# Patient Record
Sex: MALE | Race: WHITE | NOT HISPANIC OR LATINO | Employment: OTHER | URBAN - NONMETROPOLITAN AREA
[De-identification: names, ages, dates, MRNs, and addresses within clinical notes are randomized per-mention and may not be internally consistent; named-entity substitution may affect disease eponyms.]

---

## 2022-10-13 ENCOUNTER — OFFICE VISIT (OUTPATIENT)
Dept: UROLOGY | Facility: CLINIC | Age: 77
End: 2022-10-13

## 2022-10-13 ENCOUNTER — LAB (OUTPATIENT)
Dept: LAB | Facility: HOSPITAL | Age: 77
End: 2022-10-13

## 2022-10-13 VITALS
DIASTOLIC BLOOD PRESSURE: 80 MMHG | BODY MASS INDEX: 25.03 KG/M2 | TEMPERATURE: 97.1 F | WEIGHT: 169 LBS | HEART RATE: 64 BPM | OXYGEN SATURATION: 97 % | SYSTOLIC BLOOD PRESSURE: 132 MMHG | HEIGHT: 69 IN | RESPIRATION RATE: 18 BRPM

## 2022-10-13 DIAGNOSIS — N40.1 BENIGN PROSTATIC HYPERPLASIA (BPH) WITH URINARY URGENCY: Primary | ICD-10-CM

## 2022-10-13 DIAGNOSIS — R35.1 NOCTURIA: ICD-10-CM

## 2022-10-13 DIAGNOSIS — N52.9 ERECTILE DYSFUNCTION, UNSPECIFIED ERECTILE DYSFUNCTION TYPE: ICD-10-CM

## 2022-10-13 DIAGNOSIS — R39.15 BENIGN PROSTATIC HYPERPLASIA (BPH) WITH URINARY URGENCY: Primary | ICD-10-CM

## 2022-10-13 LAB
BILIRUB BLD-MCNC: NEGATIVE MG/DL
CLARITY, POC: CLEAR
COLOR UR: YELLOW
EXPIRATION DATE: NORMAL
GLUCOSE UR STRIP-MCNC: NEGATIVE MG/DL
KETONES UR QL: NEGATIVE
LEUKOCYTE EST, POC: NEGATIVE
Lab: NORMAL
NITRITE UR-MCNC: NEGATIVE MG/ML
PH UR: 6 [PH] (ref 5–8)
PROT UR STRIP-MCNC: NEGATIVE MG/DL
PSA SERPL-MCNC: 1.95 NG/ML (ref 0–4)
RBC # UR STRIP: NEGATIVE /UL
SP GR UR: 1.01 (ref 1–1.03)
UROBILINOGEN UR QL: NORMAL

## 2022-10-13 PROCEDURE — 84153 ASSAY OF PSA TOTAL: CPT

## 2022-10-13 PROCEDURE — 36415 COLL VENOUS BLD VENIPUNCTURE: CPT

## 2022-10-13 PROCEDURE — 51798 US URINE CAPACITY MEASURE: CPT | Performed by: UROLOGY

## 2022-10-13 PROCEDURE — 99204 OFFICE O/P NEW MOD 45 MIN: CPT | Performed by: UROLOGY

## 2022-10-13 RX ORDER — TADALAFIL 5 MG/1
5 TABLET ORAL DAILY
Qty: 30 TABLET | Refills: 11 | Status: SHIPPED | OUTPATIENT
Start: 2022-10-13 | End: 2022-12-28 | Stop reason: HOSPADM

## 2022-10-13 NOTE — PROGRESS NOTES
Chief Complaint  LUTS    Referring Provider  Renetta Wade PA    HPI  Mr. Collins is a 77 y.o. male with history of diabetes mellitus who presents with lower urinary tract symptoms.  Primary symptom includes: Weak stream, urgency, nocturia x1, frequency and intermittency  Patient denies dysuria or hematuria.  Onset was many years ago.    Previous treatments include: super beta prostate, flomax, cialis    He has ED as well. Took daily Cialis and says it helped     IPSS Questionnaire (AUA-7):  Incomplete emptying  Over the past month, how often have you had a sensation of not emptying your bladder completely after you finish?: Less than 1 time in 5 (10/13/22 0852)  Frequency  Over the past month, how often have you had to urinate again less than two hours after you finishing urinating ?: About half the time (10/13/22 0852)  Intermittency  Over the past month, how often have you found you stopped and started again several time when you urinated ?: About half the time (10/13/22 0852)  Urgency  Over the last month, how difficult  have you found it to postpone urination ?: more than half the time (10/13/22 0852)  Weak Stream  Over the past month, how often have you had a weak urinary stream ?: Almost always (10/13/22 0852)  Straining  Over the past month, how often have you had to push or strain to begin urination ?: Not at all (10/13/22 0852)  Nocturia  Over the past month, how many times did you most typically get up to urinate from the time you went to bed until the time you got up in the morning ?: Less than 1 time in 5 (10/13/22 0852)  Quality of life due to urinary symptoms  If you were to spend the rest of your life with your urinary condition the way it is now, how would feel about that?: Mostly dissatified (10/13/22 0852)    Scores  Total IPSS Score: 17 (10/13/22 0852)  Total Score = Symtomatic Level: moderately symptomatic: 8-19 (10/13/22 0852)       Past Medical History  Past Medical History:   Diagnosis  "Date   • Diabetes mellitus (HCC)        Past Surgical History  No past surgical history on file.    Medications  No current outpatient medications on file.    Allergies  No Known Allergies    Social History  Social History     Socioeconomic History   • Marital status:    Tobacco Use   • Smoking status: Never   • Smokeless tobacco: Never   Vaping Use   • Vaping Use: Never used   Substance and Sexual Activity   • Alcohol use: Never   • Drug use: Never   • Sexual activity: Defer       Family History  He has no family history of prostate cancer  No family history on file.    Review of Systems  A review of systems was notable for diabetes mellitus.    Physical Exam  Visit Vitals  /80 (BP Location: Right arm, Patient Position: Sitting, Cuff Size: Adult)   Pulse 64   Temp 97.1 °F (36.2 °C) (Temporal)   Resp 18   Ht 175.3 cm (69\")   Wt 76.7 kg (169 lb)   SpO2 97%   BMI 24.96 kg/m²     Physical exam notable for normal habitus.    Genitourinary  Deferred    Labs  No results found for: PSA    Brief Urine Lab Results     None          PVR  Post-void residual performed by staff - 0 mL      Assessment  Mr. Collins is a 77 y.o. male who presents with LUTS, primarily weak stream.    Plan  1.  Follow up for cystoscopy, TRUS, Uroflow, MICHAEL, GE. Risks are advanced age  2.  PSA  3. Daily Cialis per his request        Maik Graham MD    "

## 2022-11-10 ENCOUNTER — PROCEDURE VISIT (OUTPATIENT)
Dept: UROLOGY | Facility: CLINIC | Age: 77
End: 2022-11-10

## 2022-11-10 DIAGNOSIS — R35.1 NOCTURIA: ICD-10-CM

## 2022-11-10 DIAGNOSIS — I05.9 MITRAL VALVE DISORDER: ICD-10-CM

## 2022-11-10 DIAGNOSIS — R39.15 BENIGN PROSTATIC HYPERPLASIA (BPH) WITH URINARY URGENCY: Primary | ICD-10-CM

## 2022-11-10 DIAGNOSIS — N52.9 ERECTILE DYSFUNCTION, UNSPECIFIED ERECTILE DYSFUNCTION TYPE: ICD-10-CM

## 2022-11-10 DIAGNOSIS — N40.1 BENIGN PROSTATIC HYPERPLASIA (BPH) WITH URINARY URGENCY: Primary | ICD-10-CM

## 2022-11-10 PROCEDURE — 52000 CYSTOURETHROSCOPY: CPT | Performed by: UROLOGY

## 2022-11-10 PROCEDURE — 99214 OFFICE O/P EST MOD 30 MIN: CPT | Performed by: UROLOGY

## 2022-11-10 PROCEDURE — 76942 ECHO GUIDE FOR BIOPSY: CPT | Performed by: UROLOGY

## 2022-11-10 PROCEDURE — 51741 ELECTRO-UROFLOWMETRY FIRST: CPT | Performed by: UROLOGY

## 2022-11-10 RX ORDER — SODIUM CHLORIDE 9 MG/ML
100 INJECTION, SOLUTION INTRAVENOUS CONTINUOUS
Status: CANCELLED | OUTPATIENT
Start: 2022-11-10

## 2022-11-10 RX ORDER — SODIUM CHLORIDE 0.9 % (FLUSH) 0.9 %
3 SYRINGE (ML) INJECTION EVERY 12 HOURS SCHEDULED
Status: CANCELLED | OUTPATIENT
Start: 2022-11-10

## 2022-11-10 RX ORDER — SODIUM CHLORIDE 0.9 % (FLUSH) 0.9 %
3-10 SYRINGE (ML) INJECTION AS NEEDED
Status: CANCELLED | OUTPATIENT
Start: 2022-11-10

## 2022-11-10 NOTE — PROGRESS NOTES
CC  LUTS / BPH Workup    HPI  Ms. Collins is a 77 y.o. male with history below in assessment, who presents for follow up.     At this visit patient is here for BPH workup and discussion.     Past Medical History:   Diagnosis Date   • Diabetes mellitus (HCC)        No past surgical history on file.      Current Outpatient Medications:   •  tadalafil (CIALIS) 5 MG tablet, Take 1 tablet by mouth Daily., Disp: 30 tablet, Rfl: 11     Physical Exam  There were no vitals taken for this visit.    Labs  Brief Urine Lab Results  (Last result in the past 365 days)      Color   Clarity   Blood   Leuk Est   Nitrite   Protein   CREAT   Urine HCG        10/13/22 0904 Yellow   Clear   Negative   Negative   Negative   Negative                 No results found for: GLUCOSE, CALCIUM, NA, K, CO2, CL, BUN, CREATININE, EGFRIFAFRI, EGFRIFNONA, BCR, ANIONGAP    No results found for: WBC, HGB, HCT, MCV, PLT         Lab Results   Component Value Date    PSA 1.950 10/13/2022       Radiographic Studies  No Images in the past 120 days found..    I have reviewed above labs and imaging.     • CYSTOSCOPY  Preprocedure diagnosis  LUTS  Postprocedure diagnosis  Same  Procedure  Flexible Cystourethroscopy  Attending surgeon  Maik Graham MD  Anesthesia  2% lidocaine jelly intraurethrally  Complications  None  Indications  77 y.o. male undergoing a flexible cystoscopy for the above mentioned indications.  Informed consent was obtained.    Findings  Cystoscopic findings included one right and left ureteral orifice in the normal anatomic position with normal bladder mucosa and no tumors, masses or stones. The urethral urothelium was within normal limits with no strictures.  There was a high bladder neck/ median lobe.  The lateral lobes were also obstructive in appearance.  Asymmetrically so more on the right side  Procedure  The patient was placed in supine position and prepped and draped in sterile fashion with lidocaine jelly per urethra for  anesthesia.  A timeout was performed.  The 14F flexible cystoscope was lubricated and gently placed through the penile urethra and into the bladder.  The bladder was completely visualized.  The cystoscope was retroflexed and the bladder neck and prostate visualized.  The cystoscope was slowly withdrawn while visualizing the urethra and the procedure terminated.  The patient tolerated the procedure well.      • TRUS OF PROSTATE   Preoperative diagnosis  LUTS  Postoperative diagnosis  Same  Procedure  1.  Transrectal ultrasound of the prostate  Attending Surgeon  Maik Graham MD  Anesthesia  2% lidocaine jelly, intrarectal instillation, 10mL  Complications  None  Specimen  None  Indications  Mr. Collins is a 77 y.o. male with LUTS.  He presents for prostate ultrasound to evaluate prostate size.  Procedure  The patient was positioned and prepped in a left lateral position with lower extremities flexed.  Lidocaine jelly, 2%, was injected per rectum. A digital rectal exam was performed which demonstrated a smooth prostate without nodules or induration. The [employer] E8CS rectal ultrasound probe was slowly introduced into the rectum without difficulty.  The prostate and seminal vesicles were inspected systematically using cross and sagittal views with the ultrasound. The dimensions of the prostate were measured, for a calculated volume of 43.3 mL with 4.5 cm prostatic width.  The seminal vesicles appeared normal.  The rectal ultrasound probe was removed.  The patient tolerated the procedure well.    • UROFLOW  Peak flow rate -5.4 mL/sec  Average flow rate -2.8 mL/sec  Flow curve -weak low and flat  Voided volume -345 mL    I personally reviewed  and interpreted this study.       Assessment  77 y.o. male with worsening of chronic lower urinary tract symptoms.  He had an occlusive prostate with middle lobe, as well as very weak stream on uroflow.    He is on warfarin due to a mitral valve annuloplasty in the past in the 1990s  in [place].  We discussed this and it is unclear if he needs a Lovenox bridge to come off of his warfarin for the TURP.  I will place an urgent referral to our cardiologist Dr. Capps for guidance and cardiac risk stratification.    In a separate room and encounter after his workup, we discussed his prostatic occlusion    Plan  1. Schedule for TURP at the hospital end of December.  I think he would benefit the most from this due to the median lobe/high bladder neck.   2.  Follow-up in 4 weeks after he has seen Dr. Capps to discuss his anticoagulation plan

## 2022-12-06 ENCOUNTER — OFFICE VISIT (OUTPATIENT)
Dept: CARDIOLOGY | Facility: CLINIC | Age: 77
End: 2022-12-06

## 2022-12-06 VITALS
RESPIRATION RATE: 14 BRPM | HEIGHT: 69 IN | OXYGEN SATURATION: 98 % | WEIGHT: 167 LBS | BODY MASS INDEX: 24.73 KG/M2 | HEART RATE: 63 BPM | DIASTOLIC BLOOD PRESSURE: 72 MMHG | SYSTOLIC BLOOD PRESSURE: 130 MMHG

## 2022-12-06 DIAGNOSIS — I05.9 MITRAL VALVE DISORDER: ICD-10-CM

## 2022-12-06 DIAGNOSIS — E78.00 PURE HYPERCHOLESTEROLEMIA: ICD-10-CM

## 2022-12-06 DIAGNOSIS — Z01.810 PREOP CARDIOVASCULAR EXAM: Primary | ICD-10-CM

## 2022-12-06 DIAGNOSIS — I10 BENIGN HYPERTENSION: ICD-10-CM

## 2022-12-06 DIAGNOSIS — I48.21 PERMANENT ATRIAL FIBRILLATION: ICD-10-CM

## 2022-12-06 DIAGNOSIS — E11.00 TYPE 2 DIABETES MELLITUS WITH HYPEROSMOLARITY WITHOUT COMA, WITHOUT LONG-TERM CURRENT USE OF INSULIN: ICD-10-CM

## 2022-12-06 PROBLEM — H10.30 ACUTE CONJUNCTIVITIS: Status: ACTIVE | Noted: 2022-12-06

## 2022-12-06 PROBLEM — H52.00 HYPERMETROPIA: Status: ACTIVE | Noted: 2022-12-06

## 2022-12-06 PROBLEM — H01.029 SQUAMOUS BLEPHARITIS: Status: ACTIVE | Noted: 2022-12-06

## 2022-12-06 PROBLEM — N52.9 ERECTILE DYSFUNCTION: Status: ACTIVE | Noted: 2022-02-14

## 2022-12-06 PROBLEM — G47.00 INSOMNIA: Status: ACTIVE | Noted: 2022-02-14

## 2022-12-06 PROBLEM — H43.819 POSTERIOR VITREOUS DETACHMENT: Status: ACTIVE | Noted: 2022-12-06

## 2022-12-06 PROBLEM — E11.9 TYPE 2 DIABETES MELLITUS: Status: ACTIVE | Noted: 2022-02-08

## 2022-12-06 PROBLEM — H10.9 BACTERIAL CONJUNCTIVITIS: Status: ACTIVE | Noted: 2022-12-06

## 2022-12-06 PROBLEM — H16.149 SUPERFICIAL PUNCTATE KERATITIS: Status: ACTIVE | Noted: 2022-12-06

## 2022-12-06 PROBLEM — E53.8 COBALAMIN DEFICIENCY: Status: ACTIVE | Noted: 2022-02-14

## 2022-12-06 PROBLEM — E11.40 DIABETIC NEUROPATHY: Status: ACTIVE | Noted: 2022-02-14

## 2022-12-06 PROBLEM — E78.5 HYPERLIPIDEMIA: Status: ACTIVE | Noted: 2022-02-14

## 2022-12-06 PROCEDURE — 99204 OFFICE O/P NEW MOD 45 MIN: CPT | Performed by: INTERNAL MEDICINE

## 2022-12-06 PROCEDURE — 93000 ELECTROCARDIOGRAM COMPLETE: CPT | Performed by: INTERNAL MEDICINE

## 2022-12-06 RX ORDER — WARFARIN SODIUM 4 MG/1
TABLET ORAL
COMMUNITY
End: 2022-12-06 | Stop reason: ALTCHOICE

## 2022-12-06 RX ORDER — ROPINIROLE 0.25 MG/1
0.25 TABLET, FILM COATED ORAL
COMMUNITY
Start: 2022-11-28

## 2022-12-06 RX ORDER — ZOLPIDEM TARTRATE 5 MG/1
5 TABLET ORAL
COMMUNITY
Start: 2022-11-28 | End: 2022-12-28 | Stop reason: HOSPADM

## 2022-12-06 RX ORDER — METFORMIN HYDROCHLORIDE 500 MG/1
500 TABLET, FILM COATED, EXTENDED RELEASE ORAL 2 TIMES DAILY WITH MEALS
COMMUNITY

## 2022-12-06 RX ORDER — OXYCODONE HYDROCHLORIDE AND ACETAMINOPHEN 5; 325 MG/1; MG/1
1 TABLET ORAL EVERY 6 HOURS PRN
COMMUNITY
End: 2022-12-28 | Stop reason: HOSPADM

## 2022-12-06 RX ORDER — TADALAFIL 5 MG/1
TABLET ORAL
COMMUNITY
End: 2023-01-27

## 2022-12-06 RX ORDER — UBIDECARENONE 75 MG
500 CAPSULE ORAL
COMMUNITY

## 2022-12-06 RX ORDER — SILDENAFIL CITRATE 20 MG/1
TABLET ORAL EVERY 12 HOURS SCHEDULED
COMMUNITY
End: 2022-12-28 | Stop reason: HOSPADM

## 2022-12-06 RX ORDER — LORATADINE 10 MG/1
1 CAPSULE, LIQUID FILLED ORAL DAILY
COMMUNITY

## 2022-12-06 RX ORDER — DIPHENHYDRAMINE HCL 25 MG
25 CAPSULE ORAL NIGHTLY PRN
COMMUNITY

## 2022-12-06 RX ORDER — ATENOLOL 25 MG/1
25 TABLET ORAL DAILY
COMMUNITY

## 2022-12-06 RX ORDER — LORAZEPAM 1 MG/1
1 TABLET ORAL
COMMUNITY

## 2022-12-06 RX ORDER — ZOLPIDEM TARTRATE 10 MG/1
5 TABLET ORAL NIGHTLY PRN
COMMUNITY

## 2022-12-06 RX ORDER — DULOXETIN HYDROCHLORIDE 60 MG/1
60 CAPSULE, DELAYED RELEASE ORAL DAILY
COMMUNITY

## 2022-12-06 RX ORDER — PROMETHAZINE HYDROCHLORIDE 12.5 MG/1
12.5 TABLET ORAL
COMMUNITY
End: 2022-12-28 | Stop reason: HOSPADM

## 2022-12-06 NOTE — PROGRESS NOTES
[facility] Cardiology OP Consult Note    Obey Collins  [id number]  2022    Referred By: Maik Graham MD    Chief Complaint: Preoperative cardiac risk assessment    History of Present Illness:   Mr. Obey Collins is a 77 y.o. male who presents to the Cardiology Clinic for preoperative cardiac risk assessment.  The patient has a past medical history significant for hyperlipidemia, type 2 diabetes mellitus, and BPH for which he is scheduled to undergo TURP with urology.  He has a past cardiac history significant for remote mitral valve repair with placement of an annuloplasty ring.  He also has a history of permanent atrial fibrillation on a rate control strategy.  He is chronically anticoagulated with Coumadin.  He presents today for preoperative cardiac risk assessment.  Currently, the patient reports he is doing well from a cardiac standpoint.  He denies any significant pain chest pain.  No significant exertional chest discomfort or dyspnea.  In terms of his atrial fibrillation, he denies any palpitations.  No history of presyncopal or syncopal events.  No history of lower extremity edema, orthopnea, or PND.  His only complaint today is that while on Coumadin therapy has to travel 20 miles to have his INR checked.  No other specific complaints.    Past Cardiac Testin. Last Coronary Angio: Remote, records unavailable  2. Prior Stress Testing: None  3. Last Echo: Records unavailable  4. Prior Holter Monitor: Unknown    Review of Systems:   Review of Systems   Constitutional: Negative for activity change, appetite change, chills, diaphoresis, fatigue, fever, unexpected weight gain and unexpected weight loss.   Eyes: Negative for blurred vision and double vision.   Respiratory: Negative for cough, chest tightness, shortness of breath and wheezing.    Cardiovascular: Negative for chest pain, palpitations and leg swelling.   Gastrointestinal: Negative for abdominal pain, anal  bleeding, blood in stool and GERD.   Endocrine: Negative for cold intolerance and heat intolerance.   Genitourinary: Positive for difficulty urinating.   Neurological: Negative for dizziness, syncope, weakness and light-headedness.   Hematological: Does not bruise/bleed easily.   Psychiatric/Behavioral: Negative for depressed mood and stress. The patient is not nervous/anxious.        Past Medical History:   Past Medical History:   Diagnosis Date   • Diabetes mellitus (HCC)        Past Surgical History: No past surgical history on file.    Family History: No family history on file.    Social History:   Social History     Socioeconomic History   • Marital status:    Tobacco Use   • Smoking status: Never   • Smokeless tobacco: Never   Vaping Use   • Vaping Use: Never used   Substance and Sexual Activity   • Alcohol use: Never   • Drug use: Never   • Sexual activity: Defer       Medications:     Current Outpatient Medications:   •  atenolol (TENORMIN) 25 MG tablet, atenolol 25 mg tablet, Disp: , Rfl:   •  diphenhydrAMINE (BENADRYL) 25 mg capsule, Every 4 (Four) Hours., Disp: , Rfl:   •  DULoxetine (CYMBALTA) 60 MG capsule, duloxetine 60 mg capsule,delayed release  TAKE 1 CAPSULE BY MOUTH EVERY DAY AS DIRECTED, Disp: , Rfl:   •  Loratadine 10 MG capsule, Take 1 tablet by mouth Daily., Disp: , Rfl:   •  metFORMIN (GLUMETZA) 500 MG (MOD) 24 hr tablet, metformin  mg 24 hr tablet,extended release  Take 1 tablet every day by oral route as directed., Disp: , Rfl:   •  tadalafil (CIALIS) 5 MG tablet, Take 1 tablet by mouth Daily., Disp: 30 tablet, Rfl: 11  •  zolpidem (AMBIEN) 10 MG tablet, zolpidem 10 mg tablet, Disp: , Rfl:   •  apixaban (ELIQUIS) 5 MG tablet tablet, Take 1 tablet by mouth 2 (Two) Times a Day., Disp: 180 tablet, Rfl: 3  •  LORazepam (ATIVAN) 1 MG tablet, Take 1 mg by mouth., Disp: , Rfl:   •  oxyCODONE-acetaminophen (PERCOCET) 5-325 MG per tablet, Take 1 tablet by mouth Every 6 (Six) Hours As  "Needed., Disp: , Rfl:   •  polyethyl glycol-propyl glycol (SYSTANE) 0.4-0.3 % solution ophthalmic solution (artificial tears), apply 1 drop by ophthalmic route 4 times every day or as needed, Disp: , Rfl:   •  promethazine (PHENERGAN) 12.5 MG tablet, Take 12.5 mg by mouth., Disp: , Rfl:   •  rOPINIRole (REQUIP) 0.25 MG tablet, Take 0.25 mg by mouth every night at bedtime., Disp: , Rfl:   •  sildenafil (REVATIO) 20 MG tablet, Every 12 (Twelve) Hours., Disp: , Rfl:   •  tadalafil (CIALIS) 5 MG tablet, tadalafil 5 mg tablet  Take 1 tablet every day by oral route as directed for 90 days., Disp: , Rfl:   •  vitamin B-12 (CYANOCOBALAMIN) 100 MCG tablet, Take 50 mcg by mouth Daily., Disp: , Rfl:   •  zolpidem (AMBIEN) 5 MG tablet, Take 5 mg by mouth every night at bedtime., Disp: , Rfl:     Allergies:   No Known Allergies    Physical Exam:  Vital Signs:   Vitals:    12/06/22 0912   BP: 130/72   BP Location: Right arm   Patient Position: Sitting   Pulse: 63   Resp: 14   SpO2: 98%   Weight: 75.8 kg (167 lb)   Height: 175.3 cm (69\")       Physical Exam  Constitutional:       General: He is not in acute distress.     Appearance: Normal appearance. He is not diaphoretic.   HENT:      Head: Normocephalic and atraumatic.   Cardiovascular:      Rate and Rhythm: Normal rate. Rhythm irregular.      Heart sounds: No murmur heard.  Pulmonary:      Effort: Pulmonary effort is normal. No respiratory distress.      Breath sounds: Normal breath sounds. No stridor. No wheezing, rhonchi or rales.   Abdominal:      General: Bowel sounds are normal. There is no distension.      Palpations: Abdomen is soft.      Tenderness: There is no abdominal tenderness. There is no guarding or rebound.   Musculoskeletal:         General: No swelling. Normal range of motion.      Cervical back: Neck supple. No tenderness.   Skin:     General: Skin is warm and dry.   Neurological:      General: No focal deficit present.      Mental Status: He is alert and " oriented to person, place, and time.   Psychiatric:         Mood and Affect: Mood normal.         Behavior: Behavior normal.         Results Review:   I reviewed the patient's new clinical results.  I personally viewed and interpreted the patient's EKG/Telemetry data      ECG 12 Lead    Date/Time: 12/6/2022 11:59 AM  Performed by: Jamal Capps MD  Authorized by: Jamal Capps MD   Comparison: not compared with previous ECG   Rhythm: atrial fibrillation  Rate: normal  QRS axis: normal    Clinical impression: abnormal EKG            Assessment / Plan:     1. Preop cardiovascular exam  -- Presents today for preoperative cardiac risk assessment before undergoing TURP due to BPH  --No current chest pain or exertional anginal symptoms  --ECG today shows atrial fibrillation, no acute or prior ischemic changes  --No evidence of decompensated CHF, valvulopathy, or arrhythmia  --The patient is currently at low risk for adverse perioperative cardiac events with a low risk of revised cardiac risk index, given his functional status is greater than 4 METS without angina, it is reasonable to proceed with the proposed surgery without further cardiac testing or interventions    2. Permanent atrial fibrillation   -- Rate controlled, asymptomatic  --Continue atenolol for rate control strategy  --As the patient wishes to switch from Coumadin to a DOAC, recommend discontinuation of Coumadin prior to undergoing TURP, no need for Lovenox bridge  --When okay to restart anticoagulation from a urologic standpoint, will start Eliquis 5 mg p.o. twice daily    3.  History of mitral regurgitation  --S/p remote mitral valve repair with placement of an annuloplasty ring    4. Benign hypertension  -- BP adequately controlled today    5. Pure hypercholesterolemia  -- No current lipid profile available for review  --Not currently on statin therapy  --Will review next lipid profile obtained by PCP    6. Type 2 diabetes mellitus  -- No  hemoglobin A1c available for review  --Management per PCP      Follow Up:   Return in about 6 months (around 6/6/2023).      Thank you for allowing me to participate in the care of your patient. Please to not hesitate to contact me with additional questions or concerns.     EDGAR Capps MD  Interventional Cardiology   12/06/2022  09:35 EST

## 2022-12-08 ENCOUNTER — OFFICE VISIT (OUTPATIENT)
Dept: UROLOGY | Facility: CLINIC | Age: 77
End: 2022-12-08

## 2022-12-08 VITALS
TEMPERATURE: 96.6 F | OXYGEN SATURATION: 99 % | DIASTOLIC BLOOD PRESSURE: 82 MMHG | SYSTOLIC BLOOD PRESSURE: 108 MMHG | WEIGHT: 167 LBS | BODY MASS INDEX: 24.73 KG/M2 | HEART RATE: 81 BPM | HEIGHT: 69 IN

## 2022-12-08 DIAGNOSIS — R39.9 LOWER URINARY TRACT SYMPTOMS (LUTS): Primary | ICD-10-CM

## 2022-12-08 PROCEDURE — 99214 OFFICE O/P EST MOD 30 MIN: CPT | Performed by: UROLOGY

## 2022-12-08 NOTE — PROGRESS NOTES
Chief Complaint   Patient presents with   • Benign Prostatic Hypertrophy     Discuss surgery        HPI  Ms. Collins is a 77 y.o. male with history below in assessment, who presents for follow up.     At this visit here to discuss surgical planning    Past Medical History:   Diagnosis Date   • Diabetes mellitus (HCC)        No past surgical history on file.      Current Outpatient Medications:   •  apixaban (ELIQUIS) 5 MG tablet tablet, Take 1 tablet by mouth 2 (Two) Times a Day., Disp: 180 tablet, Rfl: 3  •  atenolol (TENORMIN) 25 MG tablet, atenolol 25 mg tablet, Disp: , Rfl:   •  diphenhydrAMINE (BENADRYL) 25 mg capsule, Every 4 (Four) Hours., Disp: , Rfl:   •  DULoxetine (CYMBALTA) 60 MG capsule, duloxetine 60 mg capsule,delayed release  TAKE 1 CAPSULE BY MOUTH EVERY DAY AS DIRECTED, Disp: , Rfl:   •  Loratadine 10 MG capsule, Take 1 tablet by mouth Daily., Disp: , Rfl:   •  LORazepam (ATIVAN) 1 MG tablet, Take 1 mg by mouth., Disp: , Rfl:   •  metFORMIN (GLUMETZA) 500 MG (MOD) 24 hr tablet, metformin  mg 24 hr tablet,extended release  Take 1 tablet every day by oral route as directed., Disp: , Rfl:   •  oxyCODONE-acetaminophen (PERCOCET) 5-325 MG per tablet, Take 1 tablet by mouth Every 6 (Six) Hours As Needed., Disp: , Rfl:   •  promethazine (PHENERGAN) 12.5 MG tablet, Take 12.5 mg by mouth., Disp: , Rfl:   •  rOPINIRole (REQUIP) 0.25 MG tablet, Take 0.25 mg by mouth every night at bedtime., Disp: , Rfl:   •  sildenafil (REVATIO) 20 MG tablet, Every 12 (Twelve) Hours., Disp: , Rfl:   •  tadalafil (CIALIS) 5 MG tablet, Take 1 tablet by mouth Daily., Disp: 30 tablet, Rfl: 11  •  tadalafil (CIALIS) 5 MG tablet, tadalafil 5 mg tablet  Take 1 tablet every day by oral route as directed for 90 days., Disp: , Rfl:   •  vitamin B-12 (CYANOCOBALAMIN) 100 MCG tablet, Take 50 mcg by mouth Daily., Disp: , Rfl:   •  zolpidem (AMBIEN) 10 MG tablet, zolpidem 10 mg tablet, Disp: , Rfl:   •  zolpidem (AMBIEN) 5 MG tablet,  "Take 5 mg by mouth every night at bedtime., Disp: , Rfl:      Physical Exam  Visit Vitals  /82 (BP Location: Right arm, Patient Position: Sitting, Cuff Size: Adult)   Pulse 81   Temp 96.6 °F (35.9 °C) (Temporal)   Ht 175.3 cm (69\")   Wt 75.8 kg (167 lb)   SpO2 99%   BMI 24.66 kg/m²       Labs  Brief Urine Lab Results  (Last result in the past 365 days)      Color   Clarity   Blood   Leuk Est   Nitrite   Protein   CREAT   Urine HCG        10/13/22 0904 Yellow   Clear   Negative   Negative   Negative   Negative                 No results found for: GLUCOSE, CALCIUM, NA, K, CO2, CL, BUN, CREATININE, EGFRIFAFRI, EGFRIFNONA, BCR, ANIONGAP    No results found for: WBC, HGB, HCT, MCV, PLT         Lab Results   Component Value Date    PSA 1.950 10/13/2022           Radiographic Studies  No Images in the past 120 days found..      I have reviewed above labs and imaging.     Assessment  77 y.o. male with ith worsening of chronic lower urinary tract symptoms.  He had an occlusive prostate with middle lobe, as well as very weak stream on uroflow.    He is on warfarin due to a mitral valve annuloplasty in the past in the 1990s in [place].  We discussed this and it is unclear if he needs a Lovenox bridge to come off of his warfarin for the TURP.  I will place an urgent referral to our cardiologist Dr. Capps for guidance and cardiac risk stratification.    In a separate room and encounter after his workup, we discussed his prostatic occlusion    Plan  1. Schedule for TURP at the hospital end of December 12/28.  I think he would benefit the most from this due to the median lobe/high bladder neck.   2.  Based on Dr. Capps's evaluation, he can stop his Coumadin 1 week prior, and we will have him start the Eliquis when his urine is clear after surgery    I spent a total of 30 minutes with the patient and the chart engaging in data gathering and interpretation, patient interaction, as well as counseling on the risks, " benefits, and alternatives of the therapy and coordinating care.

## 2022-12-09 ENCOUNTER — TELEPHONE (OUTPATIENT)
Dept: CARDIOLOGY | Facility: CLINIC | Age: 77
End: 2022-12-09

## 2022-12-09 NOTE — TELEPHONE ENCOUNTER
Patient states that Eliquis is $47 for three months. Pt states that he has not stopped taking Warfarin until his surgery and will decide then if he wants to take Eliquis or Warfarin.

## 2022-12-09 NOTE — TELEPHONE ENCOUNTER
Caller: Obey Collins    Relationship: Self    Best call back number: [phone]        Who are you requesting to speak with (clinical staff, provider,  specific staff member): CLINICAL AND DR TOWNSEND      What was the call regarding: DR TOWNSEND WANTED PT TO CALL BACK AND TELL HIM THE PRICE DIFFERENCE BETWEEN COUMADIN AND ELIQUIS.  HE SAID ELIQUIS WAS $35 HIGHER                 Do you require a callback: NO

## 2022-12-13 ENCOUNTER — TELEPHONE (OUTPATIENT)
Dept: PREADMISSION TESTING | Facility: HOSPITAL | Age: 77
End: 2022-12-13

## 2022-12-14 ENCOUNTER — PRE-ADMISSION TESTING (OUTPATIENT)
Dept: PREADMISSION TESTING | Facility: HOSPITAL | Age: 77
End: 2022-12-14

## 2022-12-14 VITALS — WEIGHT: 170.6 LBS | BODY MASS INDEX: 24.42 KG/M2 | HEIGHT: 70 IN

## 2022-12-14 DIAGNOSIS — R35.1 NOCTURIA: ICD-10-CM

## 2022-12-14 DIAGNOSIS — R39.15 BENIGN PROSTATIC HYPERPLASIA (BPH) WITH URINARY URGENCY: ICD-10-CM

## 2022-12-14 DIAGNOSIS — N40.1 BENIGN PROSTATIC HYPERPLASIA (BPH) WITH URINARY URGENCY: ICD-10-CM

## 2022-12-14 LAB
BILIRUB UR QL STRIP: NEGATIVE
CLARITY UR: CLEAR
COLOR UR: YELLOW
GLUCOSE UR STRIP-MCNC: NEGATIVE MG/DL
HGB UR QL STRIP.AUTO: NEGATIVE
KETONES UR QL STRIP: NEGATIVE
LEUKOCYTE ESTERASE UR QL STRIP.AUTO: NEGATIVE
NITRITE UR QL STRIP: NEGATIVE
PH UR STRIP.AUTO: 7.5 [PH] (ref 5–8)
PROT UR QL STRIP: NEGATIVE
SP GR UR STRIP: 1.01 (ref 1–1.03)
UROBILINOGEN UR QL STRIP: NORMAL

## 2022-12-14 PROCEDURE — 85025 COMPLETE CBC W/AUTO DIFF WBC: CPT | Performed by: UROLOGY

## 2022-12-14 PROCEDURE — 80048 BASIC METABOLIC PNL TOTAL CA: CPT | Performed by: UROLOGY

## 2022-12-14 PROCEDURE — 81003 URINALYSIS AUTO W/O SCOPE: CPT

## 2022-12-14 RX ORDER — ASPIRIN 81 MG/1
81 TABLET, CHEWABLE ORAL DAILY
COMMUNITY

## 2022-12-16 ENCOUNTER — HOSPITAL ENCOUNTER (OUTPATIENT)
Dept: GENERAL RADIOLOGY | Facility: HOSPITAL | Age: 77
Discharge: HOME | End: 2022-12-16
Payer: MEDICARE

## 2022-12-16 DIAGNOSIS — R13.10 PROBLEMS WITH SWALLOWING AND MASTICATION: ICD-10-CM

## 2022-12-16 PROCEDURE — 74220 X-RAY XM ESOPHAGUS 1CNTRST: CPT

## 2022-12-21 ENCOUNTER — TELEPHONE (OUTPATIENT)
Dept: CARDIOLOGY | Facility: CLINIC | Age: 77
End: 2022-12-21

## 2022-12-21 NOTE — TELEPHONE ENCOUNTER
Spoke with patient's spouse. States he never started Eliquis due to the price. Information was given and understood.

## 2022-12-21 NOTE — TELEPHONE ENCOUNTER
It is not uncommon for patients to be asked to hold blood thinners prior to a procedure or surgery.  From a cardiac standpoint.  He can hold Eliquis for 72 hours and aspirin for 7 days prior to procedure/surgery.    I do not see warfarin on his medication list today.    Those are our recommendations.  Encouraged him to reach out to urology for additional information since Dr. Graham will be performing the procedure.

## 2022-12-21 NOTE — TELEPHONE ENCOUNTER
Caller: Obey Collins    Relationship: Self    Best call back number: [phone]    What is the best time to reach you: ANYTIME    What was the call regarding: PATIENT IS HAVING SURGERY ON 12/28/22 AND WAS TAKING OFF OF THE WARFARIN BUT HE IS STILL ON A BABY ASPRIN. WANTS TO KNOW IF HE NEEDS TO STOP THIS BEFORE SURGERY.     Do you require a callback: YES

## 2022-12-27 ENCOUNTER — TELEPHONE (OUTPATIENT)
Dept: CARDIOLOGY | Facility: CLINIC | Age: 77
End: 2022-12-27

## 2022-12-27 ENCOUNTER — TELEPHONE (OUTPATIENT)
Dept: UROLOGY | Facility: CLINIC | Age: 77
End: 2022-12-27

## 2022-12-27 NOTE — TELEPHONE ENCOUNTER
I CALLED RAMIRO TO INQUIRE IF HE HAD STARTED ELIQUIS 5 MG.  HE STATED HE IS TAKING WARFARIN INSTEAD, BUT HAS STOPPED IT, TODAY MAKES 7 DAYS.  HE ALSO STATED HE DOESN'T PLAN TO START TO SWITCH TO  ELIQUIS AFTER SURGERY DUE TO THE COST, HE WILL CONTINUE TO TAKE WARFARIN.

## 2022-12-27 NOTE — PAT
Spoke with BALTA Gomez at Dr. Capps's office.  Informed that Megan from Dr. Graham's office had spoke with the pt this a.m. regarding anticoagulation.  Inquired regarding pt's anticoagulation therapy.  Patricia stated that pt is remaining on warfarin and not switching to eliquis due to the price.

## 2022-12-28 ENCOUNTER — HOSPITAL ENCOUNTER (OUTPATIENT)
Facility: HOSPITAL | Age: 77
Setting detail: HOSPITAL OUTPATIENT SURGERY
Discharge: HOME | End: 2022-12-28
Attending: UROLOGY | Admitting: UROLOGY

## 2022-12-28 ENCOUNTER — ANESTHESIA EVENT (OUTPATIENT)
Dept: PERIOP | Facility: HOSPITAL | Age: 77
End: 2022-12-28

## 2022-12-28 ENCOUNTER — ANESTHESIA (OUTPATIENT)
Dept: PERIOP | Facility: HOSPITAL | Age: 77
End: 2022-12-28

## 2022-12-28 VITALS
RESPIRATION RATE: 18 BRPM | HEART RATE: 84 BPM | TEMPERATURE: 97.2 F | SYSTOLIC BLOOD PRESSURE: 143 MMHG | DIASTOLIC BLOOD PRESSURE: 86 MMHG | OXYGEN SATURATION: 97 %

## 2022-12-28 DIAGNOSIS — R39.15 BENIGN PROSTATIC HYPERPLASIA (BPH) WITH URINARY URGENCY: ICD-10-CM

## 2022-12-28 DIAGNOSIS — N40.1 BENIGN PROSTATIC HYPERPLASIA (BPH) WITH URINARY URGENCY: ICD-10-CM

## 2022-12-28 DIAGNOSIS — R35.1 NOCTURIA: ICD-10-CM

## 2022-12-28 LAB — GLUCOSE BLDC GLUCOMTR-MCNC: 113 MG/DL (ref 70–130)

## 2022-12-28 PROCEDURE — 82962 GLUCOSE BLOOD TEST: CPT

## 2022-12-28 PROCEDURE — 25010000002 FENTANYL CITRATE (PF) 100 MCG/2ML SOLUTION: Performed by: NURSE ANESTHETIST, CERTIFIED REGISTERED

## 2022-12-28 PROCEDURE — 25010000002 ONDANSETRON PER 1 MG: Performed by: NURSE ANESTHETIST, CERTIFIED REGISTERED

## 2022-12-28 PROCEDURE — 25010000002 PROPOFOL 200 MG/20ML EMULSION: Performed by: NURSE ANESTHETIST, CERTIFIED REGISTERED

## 2022-12-28 PROCEDURE — 88305 TISSUE EXAM BY PATHOLOGIST: CPT

## 2022-12-28 PROCEDURE — 52601 PROSTATECTOMY (TURP): CPT | Performed by: UROLOGY

## 2022-12-28 PROCEDURE — 0 CEFAZOLIN SODIUM-DEXTROSE 2-3 GM-%(50ML) RECONSTITUTED SOLUTION: Performed by: UROLOGY

## 2022-12-28 RX ORDER — SULFAMETHOXAZOLE AND TRIMETHOPRIM 800; 160 MG/1; MG/1
1 TABLET ORAL 2 TIMES DAILY
Qty: 6 TABLET | Refills: 0 | Status: SHIPPED | OUTPATIENT
Start: 2022-12-28

## 2022-12-28 RX ORDER — CEFAZOLIN SODIUM 2 G/50ML
2 SOLUTION INTRAVENOUS ONCE
Status: COMPLETED | OUTPATIENT
Start: 2022-12-28 | End: 2022-12-28

## 2022-12-28 RX ORDER — SODIUM CHLORIDE 0.9 % (FLUSH) 0.9 %
3-10 SYRINGE (ML) INJECTION AS NEEDED
Status: DISCONTINUED | OUTPATIENT
Start: 2022-12-28 | End: 2022-12-28 | Stop reason: HOSPADM

## 2022-12-28 RX ORDER — ONDANSETRON 2 MG/ML
INJECTION INTRAMUSCULAR; INTRAVENOUS AS NEEDED
Status: DISCONTINUED | OUTPATIENT
Start: 2022-12-28 | End: 2022-12-28 | Stop reason: SURG

## 2022-12-28 RX ORDER — LIDOCAINE HYDROCHLORIDE 20 MG/ML
INJECTION, SOLUTION INTRAVENOUS AS NEEDED
Status: DISCONTINUED | OUTPATIENT
Start: 2022-12-28 | End: 2022-12-28 | Stop reason: SURG

## 2022-12-28 RX ORDER — SODIUM CHLORIDE 9 MG/ML
100 INJECTION, SOLUTION INTRAVENOUS CONTINUOUS
Status: DISCONTINUED | OUTPATIENT
Start: 2022-12-28 | End: 2022-12-28 | Stop reason: HOSPADM

## 2022-12-28 RX ORDER — ACETAMINOPHEN 500 MG
1000 TABLET ORAL EVERY 6 HOURS
Qty: 30 TABLET | Refills: 0 | Status: SHIPPED | OUTPATIENT
Start: 2022-12-28 | End: 2022-12-31

## 2022-12-28 RX ORDER — SODIUM CHLORIDE 0.9 % (FLUSH) 0.9 %
3 SYRINGE (ML) INJECTION EVERY 12 HOURS SCHEDULED
Status: DISCONTINUED | OUTPATIENT
Start: 2022-12-28 | End: 2022-12-28 | Stop reason: HOSPADM

## 2022-12-28 RX ORDER — PROPOFOL 10 MG/ML
INJECTION, EMULSION INTRAVENOUS AS NEEDED
Status: DISCONTINUED | OUTPATIENT
Start: 2022-12-28 | End: 2022-12-28 | Stop reason: SURG

## 2022-12-28 RX ORDER — PHENAZOPYRIDINE HYDROCHLORIDE 100 MG/1
100 TABLET, FILM COATED ORAL 3 TIMES DAILY PRN
Qty: 21 TABLET | Refills: 0 | Status: SHIPPED | OUTPATIENT
Start: 2022-12-28

## 2022-12-28 RX ORDER — FENTANYL CITRATE 50 UG/ML
INJECTION, SOLUTION INTRAMUSCULAR; INTRAVENOUS AS NEEDED
Status: DISCONTINUED | OUTPATIENT
Start: 2022-12-28 | End: 2022-12-28 | Stop reason: SURG

## 2022-12-28 RX ADMIN — FENTANYL CITRATE 25 MCG: 50 INJECTION INTRAMUSCULAR; INTRAVENOUS at 09:59

## 2022-12-28 RX ADMIN — CEFAZOLIN SODIUM 2 G: 2 SOLUTION INTRAVENOUS at 09:31

## 2022-12-28 RX ADMIN — FENTANYL CITRATE 25 MCG: 50 INJECTION INTRAMUSCULAR; INTRAVENOUS at 10:15

## 2022-12-28 RX ADMIN — FENTANYL CITRATE 50 MCG: 50 INJECTION INTRAMUSCULAR; INTRAVENOUS at 09:39

## 2022-12-28 RX ADMIN — FENTANYL CITRATE 25 MCG: 50 INJECTION INTRAMUSCULAR; INTRAVENOUS at 09:58

## 2022-12-28 RX ADMIN — FENTANYL CITRATE 25 MCG: 50 INJECTION INTRAMUSCULAR; INTRAVENOUS at 10:32

## 2022-12-28 RX ADMIN — SODIUM CHLORIDE 100 ML/HR: 9 INJECTION, SOLUTION INTRAVENOUS at 08:22

## 2022-12-28 RX ADMIN — PROPOFOL 150 MG: 10 INJECTION, EMULSION INTRAVENOUS at 09:36

## 2022-12-28 RX ADMIN — ONDANSETRON 4 MG: 2 INJECTION INTRAMUSCULAR; INTRAVENOUS at 09:39

## 2022-12-28 RX ADMIN — LIDOCAINE HYDROCHLORIDE 40 MG: 20 INJECTION, SOLUTION INTRAVENOUS at 09:36

## 2022-12-28 NOTE — ANESTHESIA PROCEDURE NOTES
Airway  Urgency: elective    Date/Time: 12/28/2022 9:38 AM  Airway not difficult    General Information and Staff    Patient location during procedure: OR  CRNA/CAA: Ari Kelly, [id number]    Indications and Patient Condition  Indications for airway management: airway protection    Preoxygenated: yes  Mask difficulty assessment: 0 - not attempted    Final Airway Details  Final airway type: supraglottic airway      Successful airway: classic  Size 4     Number of attempts at approach: 1  Assessment: lips, teeth, and gum same as pre-op and atraumatic intubation    Additional Comments  Cuff pressure/leak less than [id number]

## 2022-12-28 NOTE — ANESTHESIA POSTPROCEDURE EVALUATION
Patient: Obey Collins Sr.    Procedure Summary     Date: 12/28/22 Room / Location: [facility] FLUORO /  [facility] OR    Anesthesia Start: 0931 Anesthesia Stop: 1048    Procedure: TRANSURETHRAL RESECTION OF PROSTATE Diagnosis:       Benign prostatic hyperplasia (BPH) with urinary urgency      Nocturia      (Benign prostatic hyperplasia (BPH) with urinary urgency [N40.1, R39.15])      (Nocturia [R35.1])    Surgeons: Maik Graham MD Provider: Ari Kelly CRNA    Anesthesia Type: MAC ASA Status: 3          Anesthesia Type: MAC    Vitals  HR 91  Resp 12  Sat 99  /83  Temp 97.4        Post Anesthesia Care and Evaluation    Patient location during evaluation: PACU  Patient participation: complete - patient participated  Level of consciousness: awake and alert  Pain management: satisfactory to patient    Airway patency: patent  Anesthetic complications: No anesthetic complications    Cardiovascular status: acceptable and stable  Respiratory status: acceptable and face mask  Hydration status: acceptable

## 2022-12-28 NOTE — OP NOTE
Preoperative diagnosis  Clinical BPH (symptoms included acute urinary retention, weak stream, straining, hesitancy, and incomplete emptying)    Postoperative diagnosis  Clinical BPH (symptoms included acute urinary retention, weak stream, straining, hesitancy, and incomplete emptying)    Procedure performed  1.  Rigid cystoscopy  2.  Transurethral resection of prostate with bipolar energy (CPT - 48233)     Attending surgeon  Maik Graham MD    Anesthesia  General    EBL  10 mL    Complications  None    Specimen  Prostate chips for pathology    Findings  Cystoscopy revealed bilateral ureteral orifices . This area was not involved in the resection.  Resection proceded and remained proximal to the verumontanum.    Indications  60 y.o. male agreed to undergo the above named procedure after discussion of the alternatives, risks and benefits.    He was found to have BPH symptoms listed above and failed medical therapy.  Informed consent was obtained.      Procedure  The patient was taken to the operating room and placed supine on the operating table. Pre-operative antibiotics were administered.  Bilateral lower extremity SCDs were placed.  After induction of general anesthesia the patient was positioned in dorsal lithotomy and his cathter was removed.  A time-out was performed and the patient was prepped and draped in a sterile fashion.      A 26 Czech continuous flow resectoscope was introduced into the bladder.  The bridge was replaced with the working element.  Resection began with settings of 3 and 3.  The bladder neck was first taken down. There was a median lobe that was resected.  The lateral lobes were then resected bilaterally.  Care was taken to remain distal to the bladder neck and proximal to the verumontanum.  The ureteral orifices were able to be identified at all times.  We then utilized the half-moon vaporization electrode to achieve excellent hemostasis and the urine was clear.  18 Czech coudé  catheter was placed.    The patient tolerated the procedure well, was awakened, extubated and transferred to the recovery room in stable condition.

## 2022-12-28 NOTE — H&P
CC  No chief complaint on file.       HPI  Obey Collins Sr. is a 77 y.o. with history of   1. Nocturia    2. Benign prostatic hyperplasia (BPH) with urinary urgency         No recent fevers or new LUTS  Does not take any blood thinners    Past Medical History  Past Medical History:   Diagnosis Date   • Atrial fibrillation (HCC)    • Coronary artery disease    • Diabetes mellitus (HCC)    • Kidney stones 12/14/2022       Past Surgical History  Past Surgical History:   Procedure Laterality Date   • APPENDECTOMY     • COLONOSCOPY     • ENDOSCOPY     • HERNIA REPAIR     • MITRAL VALVE REPAIR/REPLACEMENT     • OTHER SURGICAL HISTORY      annuloplasty ring       Medications    Current Facility-Administered Medications:   •  ceFAZolin Sodium-Dextrose (ANCEF) IVPB (duplex) 2 g, 2 g, Intravenous, Once, Maik Graham MD  •  sodium chloride 0.9 % flush 3 mL, 3 mL, Intravenous, Q12H, Maik Graham MD  •  sodium chloride 0.9 % flush 3-10 mL, 3-10 mL, Intravenous, PRN, Maik Graham MD  •  sodium chloride 0.9 % infusion, 100 mL/hr, Intravenous, Continuous, Maik Graham MD, Last Rate: 100 mL/hr at 12/28/22 0822, 100 mL/hr at 12/28/22 0822    Allergies  No Known Allergies    Social History  Social History     Socioeconomic History   • Marital status:    Tobacco Use   • Smoking status: Never   • Smokeless tobacco: Never   Vaping Use   • Vaping Use: Never used   Substance and Sexual Activity   • Alcohol use: Never   • Drug use: Never   • Sexual activity: Defer       Review of Systems  Constitutional: No fevers or chills  Skin: Negative for rash  Endocrine: No heat/cold intolerance   Cardiovascular: Negative for chest pain or dyspnea on exertion  Respiratory: Negative for shortness of breath or wheezing  Gastrointestinal: No constipation, nausea or vomiting  Genitourinary: Negative for new lower urinary tract symptoms, current gross hematuria or dysuria.  Musculoskeletal: No flank  pain  Neurological:  Negative for frequent headaches or dizziness  Lymph/Heme: Negative for leg swelling or calf pain.    Physical Exam  Visit Vitals  /77 (BP Location: Right arm, Patient Position: Sitting)   Pulse 74   Temp 97.8 °F (36.6 °C) (Temporal)   Resp 17   SpO2 97%     Constitutional: NAD, WDWN.   HEENT: NCAT. Conjunctivae normal.  MMM.    Cardiovascular: Regular rate.  Pulmonary/Chest: Respirations are even and non-labored bilaterally.  Abdominal: Soft. No distension, tenderness, masses or guarding. No CVA tenderness.  Neurological: A + O x 3.  Cranial Nerves II-XII grossly intact. Normal gait.  Extremities: BUNNY x 4, Warm. No clubbing.  No cyanosis.    Skin: Pink, warm and dry.  No rashes noted.  Psychiatric:  Normal mood and affect    Labs & Imaging  Lab Results   Component Value Date    GLUCOSE 125 (H) 12/14/2022    CALCIUM 8.8 12/14/2022     12/14/2022    K 4.4 12/14/2022    CO2 28.2 12/14/2022     12/14/2022    BUN 12 12/14/2022    CREATININE 0.83 12/14/2022    BCR 14.5 12/14/2022    ANIONGAP 7.8 12/14/2022     Lab Results   Component Value Date    WBC 5.32 12/14/2022    HGB 13.4 12/14/2022    HCT 39.5 12/14/2022    MCV 93.4 12/14/2022     12/14/2022     Brief Urine Lab Results  (Last result in the past 365 days)      Color   Clarity   Blood   Leuk Est   Nitrite   Protein   CREAT   Urine HCG        12/14/22 1046 Yellow   Clear   Negative   Negative   Negative   Negative                    FL ESOPHAGRAM    Result Date: 12/16/2022  Examination: Barium esophagram. 7 photo fluorograms. 2.2 minutes of fluoroscopy time. Clinical information: Dysphagia. Findings: Under fluoroscopic observation the esophagus shows normal motility. There is a small hiatal hernia with a small amount of gastroesophageal reflux. There are no fixed obstructing lesions or mucosal erosions.    1. Small hiatal hernia with a small amount of gastroesophageal reflux. 2. Otherwise  unremarkable.          Assessment  Obey Collins Sr. is a 77 y.o. male who presents with the following diagnosis:  1. Nocturia    2. Benign prostatic hyperplasia (BPH) with urinary urgency         Plan  1. To OR for TRANSURETHRAL RESECTION OF PROSTATE     Maik Graham MD

## 2022-12-28 NOTE — DISCHARGE INSTRUCTIONS
Home Care After Prostate Treatment  The following instructions will help you care for yourself, or be cared for upon your return home today.  These are guidelines for your care right after surgery only.     Diet  Drink plenty of liquids and eat light meals today.    Start your regular diet tomorrow.    Activity  Start normal activities in twenty-four (24) hours.  Limit heavy lifting and strenuous activity for 5-7 days.    Wound Care and Hygiene  No restrictions, start normal routine.    Anesthesia Precautions & Expectations  After anesthesia, rest for 24 hours.  Do not drive, drink alcoholic beverages or make any important decisions during this time.  General anesthesia may cause a sore throat, jaw discomfort or muscle aches.  These symptoms can last for one or two days.     What to Expect after Surgery  Mild pain or burning with voiding.  Frequency or urgency with urination.  Bladder cramps.  Minimal bleeding with voiding.    Call your Doctor  Passing clots in urine preventing bladder emptying  Severe pain not controlled by oral medication  Temperature above 101.5 degrees  Inability to urinate within eight (8) hours after surgery    Catheter care  Empty the catheter bag as it fills.  Call if it is not draining.    You are to remove the Arnold catheter at home tomorrow.  Please attach the provided 10 cc syringe to the balloon port and pull back to all 10 cc of fluid is in the syringe.  Then gently pull the catheter out.  Do this in the morning.  Call the office if you are not able to pee after 6 hours.      After catheter removal  It is common to have blood tinged urine for 3-5 days.  It is common to have urgency with urination.    Other Instructions  Burning with urination is very common.  Drink plenty of water to limit this feeling.    Other Contacts  Urology Office:  [id number]   [place] [address] [phone] office  [phone] fax    To assist you in voiding:  Drink plenty of  fluids  Listen to running water while attempting to void.    Rest today    If you are unable to urinate and you have an uncomfortable urge to void or it has been   6 hours since you were discharged, return to the Emergency Room    No pushing, pulling, tugging,  heavy lifting, or strenuous activity.  No major decision making, driving, or drinking alcoholic beverages for 24 hours. ( due to the medications you have  received)  Always use good hand hygiene/washing techniques.  NO driving while taking pain medications.

## 2022-12-28 NOTE — ANESTHESIA PREPROCEDURE EVALUATION
Anesthesia Evaluation     Patient summary reviewed and Nursing notes reviewed   no history of anesthetic complications:  NPO Solid Status: > 8 hours  NPO Liquid Status: > 8 hours           Airway   Mallampati: II  TM distance: >3 FB  Neck ROM: full  Possible difficult intubation  Dental - normal exam     Pulmonary - negative pulmonary ROS and normal exam   Cardiovascular   Exercise tolerance: good (4-7 METS)    ECG reviewed  PT is on anticoagulation therapy  Patient on routine beta blocker  Rhythm: regular  Rate: normal    (+) hypertension, valvular problems/murmurs (MVR), CAD, dysrhythmias Atrial Fib, murmur, hyperlipidemia,     ROS comment: . Preop cardiovascular exam  -- Presents today for preoperative cardiac risk assessment before undergoing TURP due to BPH  --No current chest pain or exertional anginal symptoms  --ECG today shows atrial fibrillation, no acute or prior ischemic changes  --No evidence of decompensated CHF, valvulopathy, or arrhythmia  --The patient is currently at low risk for adverse perioperative cardiac events with a low risk of revised cardiac risk index, given his functional status is greater than 4 METS without angina, it is reasonable to proceed with the proposed surgery without further cardiac testing or interventions     2. Permanent atrial fibrillation   -- Rate controlled, asymptomatic  --Continue atenolol for rate control strategy  --As the patient wishes to switch from Coumadin to a DOAC, recommend discontinuation of Coumadin prior to undergoing TURP, no need for Lovenox bridge  --When okay to restart anticoagulation from a urologic standpoint, will start Eliquis 5 mg p.o. twice daily     3.  History of mitral regurgitation  --S/p remote mitral valve repair with placement of an annuloplasty ring     4. Benign hypertension  -- BP adequately controlled today     5. Pure hypercholesterolemia  -- No current lipid profile available for review  --Not currently on statin therapy  --Will  review next lipid profile obtained by PCP     6. Type 2 diabetes mellitus  -- No hemoglobin A1c available for review  --Management per PCP    Neuro/Psych- negative ROS  GI/Hepatic/Renal/Endo    (+)   renal disease CRI, diabetes mellitus type 2,     Musculoskeletal (-) negative ROS    Abdominal  - normal exam    Abdomen: soft.  Bowel sounds: normal.   Substance History - negative use     OB/GYN negative ob/gyn ROS         Other - negative ROS       ROS/Med Hx Other: RLS  BPH                Anesthesia Plan    ASA 3     general     (Risks and benefits discussed including risk of aspiration, recall and dental damage. All patient questions answered. Will continue with POC.)  intravenous induction     Anesthetic plan, risks, benefits, and alternatives have been provided, discussed and informed consent has been obtained with: patient.  Pre-procedure education provided      CODE STATUS:

## 2022-12-29 LAB — REF LAB TEST METHOD: NORMAL

## 2023-01-06 ENCOUNTER — TELEPHONE (OUTPATIENT)
Dept: UROLOGY | Facility: CLINIC | Age: 78
End: 2023-01-06
Payer: MEDICARE

## 2023-01-06 NOTE — TELEPHONE ENCOUNTER
Provider: DR. JESÚS Savage: RAMIRO OWENS  Relationship to Patient: SELF  Notes: PT REQUESTED TO SPEAK TO ONE OF THE NURSES. PLEASE CALL PT BACK [phone]. PT HAS QUESTIONS ABOUT THE RESTRICTIONS PLACED ON HIM AND FOR HOW LONG THEY ARE IN PLACE.       ---UNABLE TO WARM TRANSFER

## 2023-01-06 NOTE — TELEPHONE ENCOUNTER
I called and spoke with patient wife and I read over his restrictions to her and she states the patient was wanting to know when he could have a sexual relationship again and I informed her anytime that the procedure was on his prostate and he was able to return to normal activities 24 hrs after surgery.

## 2023-01-27 ENCOUNTER — OFFICE VISIT (OUTPATIENT)
Dept: UROLOGY | Facility: CLINIC | Age: 78
End: 2023-01-27
Payer: MEDICARE

## 2023-01-27 VITALS
HEART RATE: 76 BPM | SYSTOLIC BLOOD PRESSURE: 116 MMHG | WEIGHT: 170 LBS | OXYGEN SATURATION: 96 % | DIASTOLIC BLOOD PRESSURE: 72 MMHG | TEMPERATURE: 97.5 F | HEIGHT: 70 IN | RESPIRATION RATE: 18 BRPM | BODY MASS INDEX: 24.34 KG/M2

## 2023-01-27 DIAGNOSIS — R39.15 BENIGN PROSTATIC HYPERPLASIA (BPH) WITH URINARY URGENCY: ICD-10-CM

## 2023-01-27 DIAGNOSIS — N40.1 BENIGN PROSTATIC HYPERPLASIA (BPH) WITH URINARY URGENCY: ICD-10-CM

## 2023-01-27 DIAGNOSIS — N52.9 ERECTILE DYSFUNCTION, UNSPECIFIED ERECTILE DYSFUNCTION TYPE: Primary | ICD-10-CM

## 2023-01-27 PROCEDURE — 99024 POSTOP FOLLOW-UP VISIT: CPT | Performed by: NURSE PRACTITIONER

## 2023-01-27 PROCEDURE — 51798 US URINE CAPACITY MEASURE: CPT | Performed by: NURSE PRACTITIONER

## 2023-01-27 RX ORDER — TADALAFIL 20 MG/1
20 TABLET ORAL DAILY PRN
Qty: 30 TABLET | Refills: 3 | Status: SHIPPED | OUTPATIENT
Start: 2023-01-27

## 2023-01-27 RX ORDER — WARFARIN SODIUM 4 MG/1
TABLET ORAL
COMMUNITY
Start: 2023-01-23

## 2023-01-27 RX ORDER — OMEPRAZOLE 20 MG/1
CAPSULE, DELAYED RELEASE ORAL
COMMUNITY
Start: 2023-01-05

## 2023-01-27 NOTE — PROGRESS NOTES
Office Visit Established Male Patient     Patient Name: Obey Collins Sr.  : 1945   MRN: [id number]     Chief Complaint:   Chief Complaint   Patient presents with   • Follow-up     Post-op TURP       History of Present Illness: Mr. Obey Collins Sr. is a 77 y.o. male who presents today for follow up 4 weeks status post urolift. He is doing well feel he notices improvements in urinary symptoms over all, but has noticed no change in frequency. He is concerned about ED today. Cialis 5mg is not providing a satisfactory erection.       Subjective      Review of System:   Constitutional: No fevers or chills  Genitourinary: can not achieve erection for penetration      Past Medical History:   Past Medical History:   Diagnosis Date   • Atrial fibrillation (HCC)    • Coronary artery disease    • Diabetes mellitus (HCC)    • Kidney stones 2022       Past Surgical History:   Past Surgical History:   Procedure Laterality Date   • APPENDECTOMY     • COLONOSCOPY     • CYSTOSCOPY TRANSURETHRAL RESECTION OF PROSTATE N/A 2022    Procedure: TRANSURETHRAL RESECTION OF PROSTATE;  Surgeon: Maik Graham MD;  Location: [facility];  Service: Urology;  Laterality: N/A;   • ENDOSCOPY     • HERNIA REPAIR     • MITRAL VALVE REPAIR/REPLACEMENT     • OTHER SURGICAL HISTORY      annuloplasty ring       Family History: History reviewed. No pertinent family history.    Social History:   Social History     Socioeconomic History   • Marital status:    Tobacco Use   • Smoking status: Never   • Smokeless tobacco: Never   Vaping Use   • Vaping Use: Never used   Substance and Sexual Activity   • Alcohol use: Never   • Drug use: Never   • Sexual activity: Defer       Medications:     Current Outpatient Medications:   •  aspirin 81 MG chewable tablet, Chew 81 mg Daily., Disp: , Rfl:   •  atenolol (TENORMIN) 25 MG tablet, Take 25 mg by mouth Daily., Disp: , Rfl:   •  diphenhydrAMINE (BENADRYL) 25 mg  "capsule, 25 mg At Night As Needed., Disp: , Rfl:   •  DULoxetine (CYMBALTA) 60 MG capsule, Take 60 mg by mouth Daily., Disp: , Rfl:   •  Loratadine 10 MG capsule, Take 1 tablet by mouth Daily., Disp: , Rfl:   •  LORazepam (ATIVAN) 1 MG tablet, Take 1 mg by mouth., Disp: , Rfl:   •  metFORMIN (GLUCOPHAGE) 500 MG tablet, Take 500 mg by mouth 2 (Two) Times a Day., Disp: , Rfl:   •  metFORMIN (GLUMETZA) 500 MG (MOD) 24 hr tablet, Take 500 mg by mouth 2 (Two) Times a Day With Meals., Disp: , Rfl:   •  omeprazole (priLOSEC) 20 MG capsule, , Disp: , Rfl:   •  phenazopyridine (PYRIDIUM) 100 MG tablet, Take 1 tablet by mouth 3 (Three) Times a Day As Needed (urinary burning)., Disp: 21 tablet, Rfl: 0  •  rOPINIRole (REQUIP) 0.25 MG tablet, Take 0.25 mg by mouth every night at bedtime., Disp: , Rfl:   •  sulfamethoxazole-trimethoprim (Bactrim DS) 800-160 MG per tablet, Take 1 tablet by mouth 2 (Two) Times a Day., Disp: 6 tablet, Rfl: 0  •  tadalafil (Cialis) 20 MG tablet, Take 1 tablet by mouth Daily As Needed for Erectile Dysfunction., Disp: 30 tablet, Rfl: 3  •  vitamin B-12 (CYANOCOBALAMIN) 100 MCG tablet, Take 500 mcg by mouth 4 (Four) Times a Week., Disp: , Rfl:   •  warfarin (COUMADIN) 4 MG tablet, TAKE 2 TABLETS BY MOUTH ON FRIDAY AND TAKE 1 AND 1/2 TABLET ON ALL OTHER DAYS, Disp: , Rfl:   •  zolpidem (AMBIEN) 10 MG tablet, Take 5 mg by mouth At Night As Needed., Disp: , Rfl:     Allergies:   No Known Allergies    Objective     Physical Exam:   Vital Signs:   Vitals:    01/27/23 1037   BP: 116/72   BP Location: Left arm   Patient Position: Sitting   Cuff Size: Adult   Pulse: 76   Resp: 18   Temp: 97.5 °F (36.4 °C)   TempSrc: Temporal   SpO2: 96%   Weight: 77.1 kg (170 lb)   Height: 177.8 cm (70\")     Body mass index is 24.39 kg/m².     Physical Exam  Constitutional: NAD, WDWN.   Neurological: A + O x 3.   Psychiatric:  Normal mood and affect    Labs  Brief Urine Lab Results  (Last result in the past 365 days)      Color "   Clarity   Blood   Leuk Est   Nitrite   Protein   CREAT   Urine HCG        12/14/22 1046 Yellow   Clear   Negative   Negative   Negative   Negative                 Lab Results   Component Value Date    GLUCOSE 125 (H) 12/14/2022    CALCIUM 8.8 12/14/2022     12/14/2022    K 4.4 12/14/2022    CO2 28.2 12/14/2022     12/14/2022    BUN 12 12/14/2022    CREATININE 0.83 12/14/2022    BCR 14.5 12/14/2022    ANIONGAP 7.8 12/14/2022       Lab Results   Component Value Date    WBC 5.32 12/14/2022    HGB 13.4 12/14/2022    HCT 39.5 12/14/2022    MCV 93.4 12/14/2022     12/14/2022        PVR  Post-void residual performed with ultrasound scanner by staff and interpreted by me - 0ml      IPSS Questionnaire (AUA-7):  Over the past month…    1)  Incomplete Emptying:       How often have you had a sensation of not emptying you had the sensation of not emptying your bladder completely after you finished urinating?  2 - Less than half the time   2)  Frequency:       How often have you had the urinate again less than two hours after you finished urinating?  4 - More than half the time   3)  Intermittency:       How often have you found you stopped and started again several times when you urinated?   0 - Not at all   4) Urgency:      How often have you found it difficult to postpone urination?  2 - Less than half the time   5) Weak Stream:      How often have you had a weak urinary stream?  0 - Not at all   6) Straining:       How often have you had to push or strain to begin urination?  0 - Not at all   7) Nocturia:      How many times did you most typically get up to urinate from the time you went to bed at night until the time you got up in the morning?  2 - 2 times   Total Score:  10   The International Prostate Symptom Score (IPSS) is used to screen, diagnose, track symptoms of benign prostatic hyperplasia (BPH).   0-7 (Mild Symptoms) 8-19 (Moderate) 20-35 (Severe)   Quality of Life (QoL):  If you were to spend  the rest of your life with your urinary condition just the way it is now, how would you feel about that? 1-Pleased   Urine Leakage (Incontinence) 1-Mild (A few drops a day, no pad use)            Assessment / Plan      Assessment/Plan:   Diagnoses and all orders for this visit:    1. Erectile dysfunction, unspecified erectile dysfunction type (Primary)  -     tadalafil (Cialis) 20 MG tablet; Take 1 tablet by mouth Daily As Needed for Erectile Dysfunction.  Dispense: 30 tablet; Refill: 3    2. Benign prostatic hyperplasia (BPH) with urinary urgency     76 yo male status post urolift overall is pleased with results IPSS decreased 17-10 today. Todays discussion is more focused on ED in the past a higher dose of Cialis improved erection this was changed to daily to help with BPH symptoms as well. Recommend discontinuing daily cialis and patient can use 1 hour as needed for erection up to 20 mg.     1. IPSS 6 months  2. Discontinue daily low dose cialis and use 20 mg as needed for erection     Follow Up:   Return in about 6 months (around 7/27/2023) for Follow up Pennie.    BRIGITTE Waterman,NP-C  [facility]

## 2023-03-10 ENCOUNTER — HOSPITAL ENCOUNTER (OUTPATIENT)
Dept: CT IMAGING | Facility: HOSPITAL | Age: 78
Discharge: HOME | End: 2023-03-10
Payer: MEDICARE

## 2023-03-10 DIAGNOSIS — S09.90XA INJURY OF HEAD, INITIAL ENCOUNTER: ICD-10-CM

## 2023-03-10 PROCEDURE — 70450 CT HEAD/BRAIN W/O DYE: CPT

## 2023-03-20 ENCOUNTER — TELEPHONE (OUTPATIENT)
Dept: CARDIOLOGY | Facility: CLINIC | Age: 78
End: 2023-03-20

## 2023-03-20 NOTE — TELEPHONE ENCOUNTER
Caller: Obey Collins Sr.    Relationship: Self    Best call back number: [phone]    What is the best time to reach you: ANYTIME    What was the call regarding: PATIENT RECENTLY SAW THE FOOT DOCTOR & THEY TOLD HIM HE SHOULD HAVE HIS CARDIOLOGIST LOOK AT HIS FEET.     Do you require a callback: YES

## 2023-03-20 NOTE — TELEPHONE ENCOUNTER
"Spoke with patient. States both feet have redness near the toes and his feet stay cold. States he takes \"a pill\" to help with the pain in his feet but doesn't know the name of it. States he doesn't remember the name of the doctor he saw who advised him to see his cardiologist but it was a \"foot doctor.\"  "

## 2023-04-08 ENCOUNTER — APPOINTMENT (OUTPATIENT)
Dept: CT IMAGING | Facility: HOSPITAL | Age: 78
End: 2023-04-08
Payer: MEDICARE

## 2023-04-08 ENCOUNTER — HOSPITAL ENCOUNTER (EMERGENCY)
Facility: HOSPITAL | Age: 78
Discharge: HOME | End: 2023-04-08
Attending: EMERGENCY MEDICINE | Admitting: EMERGENCY MEDICINE
Payer: MEDICARE

## 2023-04-08 VITALS
HEIGHT: 70 IN | TEMPERATURE: 97.8 F | HEART RATE: 78 BPM | DIASTOLIC BLOOD PRESSURE: 81 MMHG | RESPIRATION RATE: 16 BRPM | WEIGHT: 170 LBS | BODY MASS INDEX: 24.34 KG/M2 | OXYGEN SATURATION: 97 % | SYSTOLIC BLOOD PRESSURE: 149 MMHG

## 2023-04-08 DIAGNOSIS — N30.01 ACUTE CYSTITIS WITH HEMATURIA: ICD-10-CM

## 2023-04-08 DIAGNOSIS — R31.0 GROSS HEMATURIA: Primary | ICD-10-CM

## 2023-04-08 LAB
ALBUMIN SERPL-MCNC: 4.4 G/DL (ref 3.5–5.2)
ALBUMIN/GLOB SERPL: 1.6 G/DL
ALP SERPL-CCNC: 77 U/L (ref 39–117)
ALT SERPL W P-5'-P-CCNC: 11 U/L (ref 1–41)
ANION GAP SERPL CALCULATED.3IONS-SCNC: 9 MMOL/L (ref 5–15)
APTT PPP: 59.7 SECONDS (ref 70–100)
AST SERPL-CCNC: 22 U/L (ref 1–40)
BACTERIA UR QL AUTO: ABNORMAL /HPF
BASOPHILS # BLD AUTO: 0.06 10*3/MM3 (ref 0–0.2)
BASOPHILS NFR BLD AUTO: 1.1 % (ref 0–1.5)
BILIRUB SERPL-MCNC: 0.4 MG/DL (ref 0–1.2)
BILIRUB UR QL STRIP: ABNORMAL
BUN SERPL-MCNC: 14 MG/DL (ref 8–23)
BUN/CREAT SERPL: 15.2 (ref 7–25)
CALCIUM SPEC-SCNC: 9.2 MG/DL (ref 8.6–10.5)
CHLORIDE SERPL-SCNC: 101 MMOL/L (ref 98–107)
CLARITY UR: ABNORMAL
CO2 SERPL-SCNC: 29 MMOL/L (ref 22–29)
COLOR UR: ABNORMAL
CREAT SERPL-MCNC: 0.92 MG/DL (ref 0.76–1.27)
D-LACTATE SERPL-SCNC: 1.4 MMOL/L (ref 0.5–2)
DEPRECATED RDW RBC AUTO: 40.1 FL (ref 37–54)
EGFRCR SERPLBLD CKD-EPI 2021: 85.1 ML/MIN/1.73
EOSINOPHIL # BLD AUTO: 0.2 10*3/MM3 (ref 0–0.4)
EOSINOPHIL NFR BLD AUTO: 3.8 % (ref 0.3–6.2)
ERYTHROCYTE [DISTWIDTH] IN BLOOD BY AUTOMATED COUNT: 11.7 % (ref 12.3–15.4)
GLOBULIN UR ELPH-MCNC: 2.8 GM/DL
GLUCOSE SERPL-MCNC: 126 MG/DL (ref 65–99)
GLUCOSE UR STRIP-MCNC: NEGATIVE MG/DL
HCT VFR BLD AUTO: 40.4 % (ref 37.5–51)
HGB BLD-MCNC: 13.5 G/DL (ref 13–17.7)
HGB UR QL STRIP.AUTO: ABNORMAL
HYALINE CASTS UR QL AUTO: ABNORMAL /LPF
IMM GRANULOCYTES # BLD AUTO: 0.01 10*3/MM3 (ref 0–0.05)
IMM GRANULOCYTES NFR BLD AUTO: 0.2 % (ref 0–0.5)
INR PPP: 2.9 (ref 0.9–1.1)
KETONES UR QL STRIP: NEGATIVE
LEUKOCYTE ESTERASE UR QL STRIP.AUTO: ABNORMAL
LYMPHOCYTES # BLD AUTO: 1.22 10*3/MM3 (ref 0.7–3.1)
LYMPHOCYTES NFR BLD AUTO: 23.2 % (ref 19.6–45.3)
MAGNESIUM SERPL-MCNC: 2 MG/DL (ref 1.6–2.4)
MCH RBC QN AUTO: 31.2 PG (ref 26.6–33)
MCHC RBC AUTO-ENTMCNC: 33.4 G/DL (ref 31.5–35.7)
MCV RBC AUTO: 93.3 FL (ref 79–97)
MONOCYTES # BLD AUTO: 0.42 10*3/MM3 (ref 0.1–0.9)
MONOCYTES NFR BLD AUTO: 8 % (ref 5–12)
NEUTROPHILS NFR BLD AUTO: 3.34 10*3/MM3 (ref 1.7–7)
NEUTROPHILS NFR BLD AUTO: 63.7 % (ref 42.7–76)
NITRITE UR QL STRIP: POSITIVE
NRBC BLD AUTO-RTO: 0 /100 WBC (ref 0–0.2)
PH UR STRIP.AUTO: 7 [PH] (ref 5–8)
PLATELET # BLD AUTO: 191 10*3/MM3 (ref 140–450)
PMV BLD AUTO: 9 FL (ref 6–12)
POTASSIUM SERPL-SCNC: 4.1 MMOL/L (ref 3.5–5.2)
PROT SERPL-MCNC: 7.2 G/DL (ref 6–8.5)
PROT UR QL STRIP: ABNORMAL
PROTHROMBIN TIME: 31.4 SECONDS (ref 12.5–14.5)
RBC # BLD AUTO: 4.33 10*6/MM3 (ref 4.14–5.8)
RBC # UR STRIP: ABNORMAL /HPF
REF LAB TEST METHOD: ABNORMAL
SODIUM SERPL-SCNC: 139 MMOL/L (ref 136–145)
SP GR UR STRIP: 1.01 (ref 1–1.03)
SQUAMOUS #/AREA URNS HPF: ABNORMAL /HPF
UROBILINOGEN UR QL STRIP: ABNORMAL
WBC # UR STRIP: ABNORMAL /HPF
WBC NRBC COR # BLD: 5.25 10*3/MM3 (ref 3.4–10.8)

## 2023-04-08 PROCEDURE — 25010000002 CEFTRIAXONE SODIUM-DEXTROSE 1-3.74 GM-%(50ML) RECONSTITUTED SOLUTION: Performed by: NURSE PRACTITIONER

## 2023-04-08 PROCEDURE — 85610 PROTHROMBIN TIME: CPT | Performed by: NURSE PRACTITIONER

## 2023-04-08 PROCEDURE — 51702 INSERT TEMP BLADDER CATH: CPT

## 2023-04-08 PROCEDURE — 87086 URINE CULTURE/COLONY COUNT: CPT | Performed by: NURSE PRACTITIONER

## 2023-04-08 PROCEDURE — 36415 COLL VENOUS BLD VENIPUNCTURE: CPT

## 2023-04-08 PROCEDURE — 83605 ASSAY OF LACTIC ACID: CPT | Performed by: NURSE PRACTITIONER

## 2023-04-08 PROCEDURE — 85025 COMPLETE CBC W/AUTO DIFF WBC: CPT | Performed by: NURSE PRACTITIONER

## 2023-04-08 PROCEDURE — 81001 URINALYSIS AUTO W/SCOPE: CPT | Performed by: NURSE PRACTITIONER

## 2023-04-08 PROCEDURE — 83735 ASSAY OF MAGNESIUM: CPT | Performed by: NURSE PRACTITIONER

## 2023-04-08 PROCEDURE — 96365 THER/PROPH/DIAG IV INF INIT: CPT

## 2023-04-08 PROCEDURE — 87040 BLOOD CULTURE FOR BACTERIA: CPT | Performed by: NURSE PRACTITIONER

## 2023-04-08 PROCEDURE — 85730 THROMBOPLASTIN TIME PARTIAL: CPT | Performed by: NURSE PRACTITIONER

## 2023-04-08 PROCEDURE — 99283 EMERGENCY DEPT VISIT LOW MDM: CPT

## 2023-04-08 PROCEDURE — 80053 COMPREHEN METABOLIC PANEL: CPT | Performed by: NURSE PRACTITIONER

## 2023-04-08 PROCEDURE — 74176 CT ABD & PELVIS W/O CONTRAST: CPT

## 2023-04-08 RX ORDER — CEFDINIR 300 MG/1
300 CAPSULE ORAL 2 TIMES DAILY
Qty: 20 CAPSULE | Refills: 0 | Status: SHIPPED | OUTPATIENT
Start: 2023-04-08

## 2023-04-08 RX ORDER — SODIUM CHLORIDE 0.9 % (FLUSH) 0.9 %
10 SYRINGE (ML) INJECTION AS NEEDED
Status: DISCONTINUED | OUTPATIENT
Start: 2023-04-08 | End: 2023-04-08 | Stop reason: HOSPADM

## 2023-04-08 RX ORDER — CEFTRIAXONE 1 G/50ML
1 INJECTION, SOLUTION INTRAVENOUS ONCE
Status: COMPLETED | OUTPATIENT
Start: 2023-04-08 | End: 2023-04-08

## 2023-04-08 RX ADMIN — SODIUM CHLORIDE 1000 ML: 9 INJECTION, SOLUTION INTRAVENOUS at 12:07

## 2023-04-08 RX ADMIN — CEFTRIAXONE 1 G: 1 INJECTION, SOLUTION INTRAVENOUS at 13:23

## 2023-04-08 NOTE — DISCHARGE INSTRUCTIONS
Call Dr Graham for appointment to follow-up on hematuria.  Continue to drink plenty of fluids and take antibiotics as directed.  If any worsening signs or symptoms occur please return to the ED immediately.

## 2023-04-08 NOTE — ED PROVIDER NOTES
Subjective   History of Present Illness  77-year-old male presents to the ED today for complaints of hematuria.  Says this started 3 days ago.  He said this started the day after he fell on his tailbone at [employer].  He said he was trying to put something up a charge and he fell hitting his tailbone.  The next day started with the blood in his urine.  Patient is walking well.  No pain in his hips.  Patient denies any other pain.  No nausea, vomiting or diarrhea.  No fevers or chills.  Patient does have history of A-fib, CAD, diabetes and kidney stones.        Review of Systems   Constitutional: Negative.    HENT: Negative.    Eyes: Negative.    Respiratory: Negative.    Cardiovascular: Negative.    Gastrointestinal: Negative.    Endocrine: Negative.    Genitourinary: Positive for hematuria.   Musculoskeletal: Negative.    Skin: Negative.    Allergic/Immunologic: Negative.    Neurological: Negative.    Hematological: Negative.    Psychiatric/Behavioral: Negative.        Past Medical History:   Diagnosis Date   • Atrial fibrillation    • Coronary artery disease    • Diabetes mellitus    • Kidney stones 12/14/2022       No Known Allergies    Past Surgical History:   Procedure Laterality Date   • APPENDECTOMY     • COLONOSCOPY     • CYSTOSCOPY TRANSURETHRAL RESECTION OF PROSTATE N/A 12/28/2022    Procedure: TRANSURETHRAL RESECTION OF PROSTATE;  Surgeon: Maik Graham MD;  Location: [facility];  Service: Urology;  Laterality: N/A;   • ENDOSCOPY     • HERNIA REPAIR     • MITRAL VALVE REPAIR/REPLACEMENT     • OTHER SURGICAL HISTORY      annuloplasty ring       History reviewed. No pertinent family history.    Social History     Socioeconomic History   • Marital status:    Tobacco Use   • Smoking status: Never   • Smokeless tobacco: Never   Vaping Use   • Vaping Use: Never used   Substance and Sexual Activity   • Alcohol use: Never   • Drug use: Never   • Sexual activity: Defer           Objective   Physical  Exam  Vitals and nursing note reviewed. Exam conducted with a chaperone present.   Constitutional:       Appearance: Normal appearance.   HENT:      Head: Normocephalic and atraumatic.      Nose: Nose normal.      Mouth/Throat:      Mouth: Mucous membranes are moist.      Pharynx: Oropharynx is clear.   Eyes:      Extraocular Movements: Extraocular movements intact.      Pupils: Pupils are equal, round, and reactive to light.   Cardiovascular:      Rate and Rhythm: Normal rate.      Pulses: Normal pulses.   Pulmonary:      Effort: Pulmonary effort is normal.      Breath sounds: Normal breath sounds.   Abdominal:      General: Bowel sounds are normal.      Palpations: Abdomen is soft.   Musculoskeletal:         General: Normal range of motion.   Skin:     General: Skin is warm and dry.      Capillary Refill: Capillary refill takes less than 2 seconds.   Neurological:      Mental Status: He is alert and oriented to person, place, and time.   Psychiatric:         Mood and Affect: Mood normal.         Behavior: Behavior normal.         Thought Content: Thought content normal.         Judgment: Judgment normal.         Procedures           ED Course  ED Course as of 04/08/23 1348   Sat Apr 08, 2023   1306 Discussed CT scan results with Dr Landers. He is okay sending patient home with meds and follow with urology [JK]      ED Course User Index  [JK] Ana Lee APRN                                           Medical Decision Making  This is a 77-year-old male who presents to the ED today for hematuria that has been going on for 3 days.  He said he did fall and landing on his tailbone the day before it did start.  He denies any pain in his legs or hips.  He is walking well.  Denies fevers, chills, nausea or vomiting.  No abdominal pain.  He has had a TURP in the past for prostate cancer.  Patient's vitals are stable today  Differential diagnosis includes kidney stone, hematuria, bladder CA, UTI, traumatic  injury,etc  Plan and intervention for today will be to scan abdomen and pelvis, get basic labs and urine sample.  Patient CT showed a cyst on the kidney but this is an old possible injury.  Recommend 3-month follow-up.  Patient will be treated with outpatient antibiotics and fluids and sent home with follow-up for this week with Dr. Graham  Discussed with Dr. Landers time with plan    Acute cystitis with hematuria: acute illness or injury  Gross hematuria: acute illness or injury  Amount and/or Complexity of Data Reviewed  Labs: ordered.  Radiology: ordered.      Risk  Prescription drug management.          Final diagnoses:   Gross hematuria   Acute cystitis with hematuria       ED Disposition  ED Disposition     ED Disposition   Discharge    Condition   Stable    Comment   --             Renetta Wade PA  [address] [place], [place] [id number] [phone]    Schedule an appointment as soon as possible for a visit   As needed, If symptoms worsen         Medication List      New Prescriptions    cefdinir 300 MG capsule  Commonly known as: OMNICEF  Take 1 capsule by mouth 2 (Two) Times a Day.           Where to Get Your Medications      These medications were sent to [facility] - [place], [place] - [address] - [phone]  - [phone] [facility], [place] [id number]    Phone: [phone]   · cefdinir 300 MG capsule          Ana Lee, BRIGITTE  04/08/23 1340       Ana Lee, BRIGITTE  04/08/23 2162

## 2023-04-09 LAB — BACTERIA SPEC AEROBE CULT: NORMAL

## 2023-04-10 NOTE — PROGRESS NOTES
"Patient: Obey Collins .    YOB: 1945    Date: 04/12/2023    Primary Care Provider: Renetta Wade PA    Chief Complaint   Patient presents with   • Mass     forehead       SUBJECTIVE:    History of present illness:  Patient is here for evaluation of a \"traumatic hematoma\" on his forehead. He states that onset was three months ago after a fall. He stated that it has increased in size with pain that comes and goes.    It is interesting that the patient did have a hematoma on the right side of his forehead after he fell but it never seemed to go away, it never decreased in size.  There is dull discomfort worse with pressure nonradiating associated with a palpable mass on the right forehead.    The following portions of the patient's history were reviewed and updated as appropriate: allergies, current medications, past family history, past medical history, past social history, past surgical history and problem list.      Review of Systems   Constitutional: Negative for chills, fever and unexpected weight change.   HENT: Negative for trouble swallowing and voice change.    Eyes: Negative for visual disturbance.   Respiratory: Negative for apnea, cough, chest tightness, shortness of breath and wheezing.    Cardiovascular: Negative for chest pain, palpitations and leg swelling.   Gastrointestinal: Negative for abdominal distention, abdominal pain, anal bleeding, blood in stool, constipation, diarrhea, nausea, rectal pain and vomiting.   Endocrine: Negative for cold intolerance and heat intolerance.   Genitourinary: Negative for difficulty urinating, dysuria, flank pain, scrotal swelling and testicular pain.   Musculoskeletal: Negative for back pain, gait problem and joint swelling.   Skin: Positive for color change. Negative for rash and wound.   Neurological: Negative for dizziness, syncope, speech difficulty, weakness, numbness and headaches.   Hematological: Negative for adenopathy. Does not " bruise/bleed easily.   Psychiatric/Behavioral: Negative for confusion. The patient is not nervous/anxious.        Allergies:  No Known Allergies    Medications:    Current Outpatient Medications:   •  aspirin 81 MG chewable tablet, Chew 1 tablet Daily., Disp: , Rfl:   •  atenolol (TENORMIN) 25 MG tablet, Take 1 tablet by mouth Daily., Disp: , Rfl:   •  cefdinir (OMNICEF) 300 MG capsule, Take 1 capsule by mouth 2 (Two) Times a Day., Disp: 20 capsule, Rfl: 0  •  diphenhydrAMINE (BENADRYL) 25 mg capsule, 1 capsule At Night As Needed., Disp: , Rfl:   •  DULoxetine (CYMBALTA) 60 MG capsule, Take 1 capsule by mouth Daily., Disp: , Rfl:   •  fluticasone (FLONASE) 50 MCG/ACT nasal spray, , Disp: , Rfl:   •  Loratadine 10 MG capsule, Take 1 capsule by mouth Daily., Disp: , Rfl:   •  LORazepam (ATIVAN) 1 MG tablet, Take 1 tablet by mouth., Disp: , Rfl:   •  metFORMIN (GLUCOPHAGE) 500 MG tablet, Take 1 tablet by mouth 2 (Two) Times a Day., Disp: , Rfl:   •  metFORMIN (GLUMETZA) 500 MG (MOD) 24 hr tablet, Take 1 tablet by mouth 2 (Two) Times a Day With Meals., Disp: , Rfl:   •  omeprazole (priLOSEC) 20 MG capsule, , Disp: , Rfl:   •  phenazopyridine (PYRIDIUM) 100 MG tablet, Take 1 tablet by mouth 3 (Three) Times a Day As Needed (urinary burning)., Disp: 21 tablet, Rfl: 0  •  rOPINIRole (REQUIP) 0.25 MG tablet, Take 1 tablet by mouth every night at bedtime., Disp: , Rfl:   •  sulfamethoxazole-trimethoprim (Bactrim DS) 800-160 MG per tablet, Take 1 tablet by mouth 2 (Two) Times a Day., Disp: 6 tablet, Rfl: 0  •  tadalafil (Cialis) 20 MG tablet, Take 1 tablet by mouth Daily As Needed for Erectile Dysfunction., Disp: 30 tablet, Rfl: 3  •  vitamin B-12 (CYANOCOBALAMIN) 100 MCG tablet, Take 5 tablets by mouth 4 (Four) Times a Week., Disp: , Rfl:   •  warfarin (COUMADIN) 4 MG tablet, TAKE 2 TABLETS BY MOUTH ON FRIDAY AND TAKE 1 AND 1/2 TABLET ON ALL OTHER DAYS, Disp: , Rfl:   •  zolpidem (AMBIEN) 10 MG tablet, Take 5 mg by mouth At  "Night As Needed., Disp: , Rfl:     History:  Past Medical History:   Diagnosis Date   • Atrial fibrillation    • Coronary artery disease    • Diabetes mellitus    • Kidney stones 12/14/2022       Past Surgical History:   Procedure Laterality Date   • APPENDECTOMY     • COLONOSCOPY     • CYSTOSCOPY TRANSURETHRAL RESECTION OF PROSTATE N/A 12/28/2022    Procedure: TRANSURETHRAL RESECTION OF PROSTATE;  Surgeon: Maik Graham MD;  Location: [facility];  Service: Urology;  Laterality: N/A;   • ENDOSCOPY     • HERNIA REPAIR     • MITRAL VALVE REPAIR/REPLACEMENT     • OTHER SURGICAL HISTORY      annuloplasty ring       History reviewed. No pertinent family history.    Social History     Tobacco Use   • Smoking status: Never   • Smokeless tobacco: Never   Vaping Use   • Vaping Use: Never used   Substance Use Topics   • Alcohol use: Never   • Drug use: Never        OBJECTIVE:    Vital Signs:   Vitals:    04/12/23 1447   BP: 124/60   Pulse: 101   Resp: 18   Temp: 98.2 °F (36.8 °C)   TempSrc: Temporal   SpO2: 97%   Weight: 79.9 kg (176 lb 3.2 oz)   Height: 177.8 cm (70\")       Physical Exam:   General Appearance:    Alert, cooperative, in no acute distress   Head:    Normocephalic, without obvious abnormality, atraumatic   Eyes:            Lids and lashes normal, conjunctivae and sclerae normal, no   icterus, no pallor, corneas clear, PERRLA   Ears:    Ears appear intact with no abnormalities noted   Throat:   No oral lesions, no thrush, oral mucosa moist   Neck:   No adenopathy, supple, trachea midline, no thyromegaly, no   carotid bruit, no JVD   Lungs:     Clear to auscultation,respirations regular, even and                  unlabored    Heart:    Regular rhythm and normal rate, normal S1 and S2, no            murmur, no gallop, no rub, no click   Chest Wall:    No abnormalities observed   Abdomen:     Normal bowel sounds, no masses, no organomegaly, soft        non-tender, non-distended, no guarding, no rebound       "          tenderness   Extremities:   Moves all extremities well, no edema, no cyanosis, no             redness   Pulses:   Pulses palpable and equal bilaterally   Skin:   No bleeding, bruising or rash, there is evidence of a palpable mass on the right forehead does not seem to be bruised in nature it is approximately 5 to 6 cm in diameter   Lymph nodes:   No palpable adenopathy   Neurologic:   Cranial nerves 2 - 12 grossly intact, sensation intact, DTR       present and equal bilaterally     Results Review:   I reviewed the patient's new clinical results.  I reviewed the patient's new imaging results and agree with the interpretation.  I reviewed the patient's other test results and agree with the interpretation    Review of Systems was reviewed and confirmed as accurate as documented by the MA.    ASSESSMENT/PLAN:    1. 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  He needs to undergo excision of this but it needs to be done in the operating room, risk and benefits of operative versus nonoperative intervention of been discussed with the patient, he understands and agrees, and wishes to proceed.    I discussed the patients findings and my recommendations with patient and family        Electronically signed by Kenneth Carrasco MD  04/19/23

## 2023-04-10 NOTE — H&P (VIEW-ONLY)
"Patient: Obey Collins .    YOB: 1945    Date: 04/12/2023    Primary Care Provider: Renetta Wade PA    Chief Complaint   Patient presents with   • Mass     forehead       SUBJECTIVE:    History of present illness:  Patient is here for evaluation of a \"traumatic hematoma\" on his forehead. He states that onset was three months ago after a fall. He stated that it has increased in size with pain that comes and goes.    It is interesting that the patient did have a hematoma on the right side of his forehead after he fell but it never seemed to go away, it never decreased in size.  There is dull discomfort worse with pressure nonradiating associated with a palpable mass on the right forehead.    The following portions of the patient's history were reviewed and updated as appropriate: allergies, current medications, past family history, past medical history, past social history, past surgical history and problem list.      Review of Systems   Constitutional: Negative for chills, fever and unexpected weight change.   HENT: Negative for trouble swallowing and voice change.    Eyes: Negative for visual disturbance.   Respiratory: Negative for apnea, cough, chest tightness, shortness of breath and wheezing.    Cardiovascular: Negative for chest pain, palpitations and leg swelling.   Gastrointestinal: Negative for abdominal distention, abdominal pain, anal bleeding, blood in stool, constipation, diarrhea, nausea, rectal pain and vomiting.   Endocrine: Negative for cold intolerance and heat intolerance.   Genitourinary: Negative for difficulty urinating, dysuria, flank pain, scrotal swelling and testicular pain.   Musculoskeletal: Negative for back pain, gait problem and joint swelling.   Skin: Positive for color change. Negative for rash and wound.   Neurological: Negative for dizziness, syncope, speech difficulty, weakness, numbness and headaches.   Hematological: Negative for adenopathy. Does not " bruise/bleed easily.   Psychiatric/Behavioral: Negative for confusion. The patient is not nervous/anxious.        Allergies:  No Known Allergies    Medications:    Current Outpatient Medications:   •  aspirin 81 MG chewable tablet, Chew 1 tablet Daily., Disp: , Rfl:   •  atenolol (TENORMIN) 25 MG tablet, Take 1 tablet by mouth Daily., Disp: , Rfl:   •  cefdinir (OMNICEF) 300 MG capsule, Take 1 capsule by mouth 2 (Two) Times a Day., Disp: 20 capsule, Rfl: 0  •  diphenhydrAMINE (BENADRYL) 25 mg capsule, 1 capsule At Night As Needed., Disp: , Rfl:   •  DULoxetine (CYMBALTA) 60 MG capsule, Take 1 capsule by mouth Daily., Disp: , Rfl:   •  fluticasone (FLONASE) 50 MCG/ACT nasal spray, , Disp: , Rfl:   •  Loratadine 10 MG capsule, Take 1 capsule by mouth Daily., Disp: , Rfl:   •  LORazepam (ATIVAN) 1 MG tablet, Take 1 tablet by mouth., Disp: , Rfl:   •  metFORMIN (GLUCOPHAGE) 500 MG tablet, Take 1 tablet by mouth 2 (Two) Times a Day., Disp: , Rfl:   •  metFORMIN (GLUMETZA) 500 MG (MOD) 24 hr tablet, Take 1 tablet by mouth 2 (Two) Times a Day With Meals., Disp: , Rfl:   •  omeprazole (priLOSEC) 20 MG capsule, , Disp: , Rfl:   •  phenazopyridine (PYRIDIUM) 100 MG tablet, Take 1 tablet by mouth 3 (Three) Times a Day As Needed (urinary burning)., Disp: 21 tablet, Rfl: 0  •  rOPINIRole (REQUIP) 0.25 MG tablet, Take 1 tablet by mouth every night at bedtime., Disp: , Rfl:   •  sulfamethoxazole-trimethoprim (Bactrim DS) 800-160 MG per tablet, Take 1 tablet by mouth 2 (Two) Times a Day., Disp: 6 tablet, Rfl: 0  •  tadalafil (Cialis) 20 MG tablet, Take 1 tablet by mouth Daily As Needed for Erectile Dysfunction., Disp: 30 tablet, Rfl: 3  •  vitamin B-12 (CYANOCOBALAMIN) 100 MCG tablet, Take 5 tablets by mouth 4 (Four) Times a Week., Disp: , Rfl:   •  warfarin (COUMADIN) 4 MG tablet, TAKE 2 TABLETS BY MOUTH ON FRIDAY AND TAKE 1 AND 1/2 TABLET ON ALL OTHER DAYS, Disp: , Rfl:   •  zolpidem (AMBIEN) 10 MG tablet, Take 5 mg by mouth At  "Night As Needed., Disp: , Rfl:     History:  Past Medical History:   Diagnosis Date   • Atrial fibrillation    • Coronary artery disease    • Diabetes mellitus    • Kidney stones 12/14/2022       Past Surgical History:   Procedure Laterality Date   • APPENDECTOMY     • COLONOSCOPY     • CYSTOSCOPY TRANSURETHRAL RESECTION OF PROSTATE N/A 12/28/2022    Procedure: TRANSURETHRAL RESECTION OF PROSTATE;  Surgeon: Maik Graham MD;  Location: [facility];  Service: Urology;  Laterality: N/A;   • ENDOSCOPY     • HERNIA REPAIR     • MITRAL VALVE REPAIR/REPLACEMENT     • OTHER SURGICAL HISTORY      annuloplasty ring       History reviewed. No pertinent family history.    Social History     Tobacco Use   • Smoking status: Never   • Smokeless tobacco: Never   Vaping Use   • Vaping Use: Never used   Substance Use Topics   • Alcohol use: Never   • Drug use: Never        OBJECTIVE:    Vital Signs:   Vitals:    04/12/23 1447   BP: 124/60   Pulse: 101   Resp: 18   Temp: 98.2 °F (36.8 °C)   TempSrc: Temporal   SpO2: 97%   Weight: 79.9 kg (176 lb 3.2 oz)   Height: 177.8 cm (70\")       Physical Exam:   General Appearance:    Alert, cooperative, in no acute distress   Head:    Normocephalic, without obvious abnormality, atraumatic   Eyes:            Lids and lashes normal, conjunctivae and sclerae normal, no   icterus, no pallor, corneas clear, PERRLA   Ears:    Ears appear intact with no abnormalities noted   Throat:   No oral lesions, no thrush, oral mucosa moist   Neck:   No adenopathy, supple, trachea midline, no thyromegaly, no   carotid bruit, no JVD   Lungs:     Clear to auscultation,respirations regular, even and                  unlabored    Heart:    Regular rhythm and normal rate, normal S1 and S2, no            murmur, no gallop, no rub, no click   Chest Wall:    No abnormalities observed   Abdomen:     Normal bowel sounds, no masses, no organomegaly, soft        non-tender, non-distended, no guarding, no rebound       "          tenderness   Extremities:   Moves all extremities well, no edema, no cyanosis, no             redness   Pulses:   Pulses palpable and equal bilaterally   Skin:   No bleeding, bruising or rash, there is evidence of a palpable mass on the right forehead does not seem to be bruised in nature it is approximately 5 to 6 cm in diameter   Lymph nodes:   No palpable adenopathy   Neurologic:   Cranial nerves 2 - 12 grossly intact, sensation intact, DTR       present and equal bilaterally     Results Review:   I reviewed the patient's new clinical results.  I reviewed the patient's new imaging results and agree with the interpretation.  I reviewed the patient's other test results and agree with the interpretation    Review of Systems was reviewed and confirmed as accurate as documented by the MA.    ASSESSMENT/PLAN:    1. 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  He needs to undergo excision of this but it needs to be done in the operating room, risk and benefits of operative versus nonoperative intervention of been discussed with the patient, he understands and agrees, and wishes to proceed.    I discussed the patients findings and my recommendations with patient and family        Electronically signed by Kenneth Carrasco MD  04/19/23

## 2023-04-12 ENCOUNTER — OFFICE VISIT (OUTPATIENT)
Dept: UROLOGY | Facility: CLINIC | Age: 78
End: 2023-04-12
Payer: MEDICARE

## 2023-04-12 ENCOUNTER — OFFICE VISIT (OUTPATIENT)
Dept: SURGERY | Facility: CLINIC | Age: 78
End: 2023-04-12
Payer: MEDICARE

## 2023-04-12 VITALS
HEIGHT: 70 IN | DIASTOLIC BLOOD PRESSURE: 60 MMHG | WEIGHT: 176.2 LBS | HEART RATE: 101 BPM | SYSTOLIC BLOOD PRESSURE: 124 MMHG | TEMPERATURE: 98.2 F | OXYGEN SATURATION: 97 % | BODY MASS INDEX: 25.22 KG/M2 | RESPIRATION RATE: 18 BRPM

## 2023-04-12 VITALS
SYSTOLIC BLOOD PRESSURE: 118 MMHG | BODY MASS INDEX: 24.34 KG/M2 | WEIGHT: 170 LBS | HEIGHT: 70 IN | DIASTOLIC BLOOD PRESSURE: 80 MMHG

## 2023-04-12 DIAGNOSIS — R31.9 HEMATURIA, UNSPECIFIED TYPE: ICD-10-CM

## 2023-04-12 DIAGNOSIS — R39.15 BENIGN PROSTATIC HYPERPLASIA (BPH) WITH URINARY URGENCY: Primary | ICD-10-CM

## 2023-04-12 DIAGNOSIS — N40.1 BENIGN PROSTATIC HYPERPLASIA (BPH) WITH URINARY URGENCY: Primary | ICD-10-CM

## 2023-04-12 DIAGNOSIS — T14.8XXA HEMATOMA: Primary | ICD-10-CM

## 2023-04-12 LAB
BILIRUB BLD-MCNC: NEGATIVE MG/DL
CLARITY, POC: CLEAR
COLOR UR: YELLOW
EXPIRATION DATE: ABNORMAL
GLUCOSE UR STRIP-MCNC: NEGATIVE MG/DL
KETONES UR QL: NEGATIVE
LEUKOCYTE EST, POC: NEGATIVE
Lab: ABNORMAL
NITRITE UR-MCNC: NEGATIVE MG/ML
PH UR: 7 [PH] (ref 5–8)
PROT UR STRIP-MCNC: NEGATIVE MG/DL
RBC # UR STRIP: ABNORMAL /UL
SP GR UR: 1.01 (ref 1–1.03)
UROBILINOGEN UR QL: NORMAL

## 2023-04-12 PROCEDURE — 3074F SYST BP LT 130 MM HG: CPT | Performed by: NURSE PRACTITIONER

## 2023-04-12 PROCEDURE — 1159F MED LIST DOCD IN RCRD: CPT | Performed by: NURSE PRACTITIONER

## 2023-04-12 PROCEDURE — 99213 OFFICE O/P EST LOW 20 MIN: CPT | Performed by: NURSE PRACTITIONER

## 2023-04-12 PROCEDURE — 3079F DIAST BP 80-89 MM HG: CPT | Performed by: NURSE PRACTITIONER

## 2023-04-12 PROCEDURE — 1160F RVW MEDS BY RX/DR IN RCRD: CPT | Performed by: NURSE PRACTITIONER

## 2023-04-12 RX ORDER — FLUTICASONE PROPIONATE 50 MCG
SPRAY, SUSPENSION (ML) NASAL
COMMUNITY
Start: 2023-02-13

## 2023-04-12 NOTE — PROGRESS NOTES
Office Visit Hematuria Male      Patient Name: Obey Collins Sr.  : 1945   MRN: [id number]     Chief Complaint:  Gross hematuria.   Chief Complaint   Patient presents with   • Follow-up     ER follow up hematuria       Referring Provider: No ref. provider found    History of Present Illness: Obey Collins Sr. is a 77 y.o. male who presents today for history of BPH with TURP who presents with gross hematuria.  Patient had a fall on  hit his tailbone and reports afterwards he had gross hematuria from Monday until Thursday.  He ended up in the ER on Saturday was diagnosed with UTI.  Urine culture returned <25,000 CFU/mL Mixed Doretha Isolated.  He has not had any bleeding since last Thursday    History of smoking?    no  History of second-hand smoking exposure? yes  History of chemotherapy?   no  History of radiation?    no  History of kidney or bladder stones?  yes  History of frequent urinary tract infections? no    Subjective      Review of System:   As noted in HPI    Past Medical History:   Past Medical History:   Diagnosis Date   • Atrial fibrillation    • Coronary artery disease    • Diabetes mellitus    • Kidney stones 2022       Past Surgical History:   Past Surgical History:   Procedure Laterality Date   • APPENDECTOMY     • COLONOSCOPY     • CYSTOSCOPY TRANSURETHRAL RESECTION OF PROSTATE N/A 2022    Procedure: TRANSURETHRAL RESECTION OF PROSTATE;  Surgeon: Maik Graham MD;  Location: [facility];  Service: Urology;  Laterality: N/A;   • ENDOSCOPY     • HERNIA REPAIR     • MITRAL VALVE REPAIR/REPLACEMENT     • OTHER SURGICAL HISTORY      annuloplasty ring       Family History: No family history on file.  No family history of prostate, bladder or kidney cancer.   No family history of kidney stones.     Social History:   Social History     Socioeconomic History   • Marital status:    Tobacco Use   • Smoking status: Never   • Smokeless tobacco: Never    Vaping Use   • Vaping Use: Never used   Substance and Sexual Activity   • Alcohol use: Never   • Drug use: Never   • Sexual activity: Defer       Medications:     Current Outpatient Medications:   •  aspirin 81 MG chewable tablet, Chew 1 tablet Daily., Disp: , Rfl:   •  atenolol (TENORMIN) 25 MG tablet, Take 1 tablet by mouth Daily., Disp: , Rfl:   •  DULoxetine (CYMBALTA) 60 MG capsule, Take 1 capsule by mouth Daily., Disp: , Rfl:   •  metFORMIN (GLUCOPHAGE) 500 MG tablet, Take 1 tablet by mouth 2 (Two) Times a Day., Disp: , Rfl:   •  tadalafil (Cialis) 20 MG tablet, Take 1 tablet by mouth Daily As Needed for Erectile Dysfunction., Disp: 30 tablet, Rfl: 3  •  vitamin B-12 (CYANOCOBALAMIN) 100 MCG tablet, Take 5 tablets by mouth 4 (Four) Times a Week., Disp: , Rfl:   •  warfarin (COUMADIN) 4 MG tablet, TAKE 2 TABLETS BY MOUTH ON FRIDAY AND TAKE 1 AND 1/2 TABLET ON ALL OTHER DAYS, Disp: , Rfl:   •  zolpidem (AMBIEN) 10 MG tablet, Take 5 mg by mouth At Night As Needed., Disp: , Rfl:   •  cefdinir (OMNICEF) 300 MG capsule, Take 1 capsule by mouth 2 (Two) Times a Day. (Patient not taking: Reported on 4/12/2023), Disp: 20 capsule, Rfl: 0  •  diphenhydrAMINE (BENADRYL) 25 mg capsule, 25 mg At Night As Needed. (Patient not taking: Reported on 4/12/2023), Disp: , Rfl:   •  fluticasone (FLONASE) 50 MCG/ACT nasal spray, , Disp: , Rfl:   •  Loratadine 10 MG capsule, Take 1 tablet by mouth Daily. (Patient not taking: Reported on 4/12/2023), Disp: , Rfl:   •  LORazepam (ATIVAN) 1 MG tablet, Take 1 mg by mouth. (Patient not taking: Reported on 4/12/2023), Disp: , Rfl:   •  metFORMIN (GLUMETZA) 500 MG (MOD) 24 hr tablet, Take 500 mg by mouth 2 (Two) Times a Day With Meals. (Patient not taking: Reported on 4/12/2023), Disp: , Rfl:   •  omeprazole (priLOSEC) 20 MG capsule, , Disp: , Rfl:   •  phenazopyridine (PYRIDIUM) 100 MG tablet, Take 1 tablet by mouth 3 (Three) Times a Day As Needed (urinary burning). (Patient not taking:  "Reported on 4/12/2023), Disp: 21 tablet, Rfl: 0  •  rOPINIRole (REQUIP) 0.25 MG tablet, Take 0.25 mg by mouth every night at bedtime. (Patient not taking: Reported on 4/12/2023), Disp: , Rfl:   •  sulfamethoxazole-trimethoprim (Bactrim DS) 800-160 MG per tablet, Take 1 tablet by mouth 2 (Two) Times a Day. (Patient not taking: Reported on 4/12/2023), Disp: 6 tablet, Rfl: 0    Allergies:   No Known Allergies    Objective     Physical Exam:   Vital Signs:   Vitals:    04/12/23 1106   BP: 118/80   BP Location: Left arm   Patient Position: Sitting   Cuff Size: Adult   Weight: 77.1 kg (170 lb)   Height: 177.8 cm (70\")     Body mass index is 24.39 kg/m².     Constitutional: NAD, WDWN.   Neurological: A + O x 3  Psychiatric:  Normal mood and affect      Radiologic Studies  CT Abdomen Pelvis Without Contrast    Result Date: 4/8/2023  Impression: No hydronephrosis or nephrolithiasis.  17 mm calcified lesion appears to arise from the medial aspect inferior pole right kidney, may be posttraumatic. No prior study for comparison and does not appear to communicate with the right renal collecting system, consider 3 month follow-up.  This report was signed and finalized on 4/8/2023 12:43 PM by Felicia Hinton MD.      Assessment / Plan      Assessment  77 y.o. male who presents with gross hematuria.  Risk factors include gross hematuria episode, patient age.  In order to complete the hematuria work up we need to perform a flexible cystoscopy     Plan  1.  We discussed the indications for diagnostic flexible cystoscopy to be performed at the next clinic visit.  2.  CT urogram ordered    Follow Up:   Return for Dr. Graham cystoscopy.    Shae Christine, APRN, NP-C  [facility] [place]   "

## 2023-04-13 LAB
BACTERIA SPEC AEROBE CULT: NORMAL
BACTERIA SPEC AEROBE CULT: NORMAL

## 2023-04-19 DIAGNOSIS — T14.8XXA HEMATOMA: Primary | ICD-10-CM

## 2023-04-19 DIAGNOSIS — D12.6 ADENOMATOUS POLYP OF COLON, UNSPECIFIED PART OF COLON: Primary | ICD-10-CM

## 2023-04-21 ENCOUNTER — TELEPHONE (OUTPATIENT)
Dept: SURGERY | Facility: CLINIC | Age: 78
End: 2023-04-21
Payer: MEDICARE

## 2023-04-21 NOTE — TELEPHONE ENCOUNTER
Called and confirmed patient surgery 04/26/23, Dr Carrasco @ [facility] spoke with patient wife

## 2023-04-26 ENCOUNTER — ANESTHESIA (OUTPATIENT)
Dept: PERIOP | Facility: HOSPITAL | Age: 78
End: 2023-04-26
Payer: MEDICARE

## 2023-04-26 ENCOUNTER — HOSPITAL ENCOUNTER (OUTPATIENT)
Facility: HOSPITAL | Age: 78
Setting detail: HOSPITAL OUTPATIENT SURGERY
Discharge: HOME | End: 2023-04-26
Attending: SURGERY | Admitting: SURGERY
Payer: MEDICARE

## 2023-04-26 ENCOUNTER — ANESTHESIA EVENT (OUTPATIENT)
Dept: PERIOP | Facility: HOSPITAL | Age: 78
End: 2023-04-26
Payer: MEDICARE

## 2023-04-26 VITALS
TEMPERATURE: 97.4 F | HEART RATE: 78 BPM | RESPIRATION RATE: 16 BRPM | OXYGEN SATURATION: 95 % | DIASTOLIC BLOOD PRESSURE: 76 MMHG | SYSTOLIC BLOOD PRESSURE: 166 MMHG

## 2023-04-26 DIAGNOSIS — H16.143 SUPERFICIAL PUNCTATE KERATITIS OF BOTH EYES: Primary | ICD-10-CM

## 2023-04-26 DIAGNOSIS — T14.8XXA HEMATOMA: ICD-10-CM

## 2023-04-26 DIAGNOSIS — N40.1 BENIGN PROSTATIC HYPERPLASIA (BPH) WITH URINARY URGENCY: ICD-10-CM

## 2023-04-26 DIAGNOSIS — R39.15 BENIGN PROSTATIC HYPERPLASIA (BPH) WITH URINARY URGENCY: ICD-10-CM

## 2023-04-26 LAB — GLUCOSE BLDC GLUCOMTR-MCNC: 130 MG/DL (ref 70–130)

## 2023-04-26 PROCEDURE — 21011 EXC FACE LES SC <2 CM: CPT | Performed by: SURGERY

## 2023-04-26 PROCEDURE — 25010000002 FENTANYL CITRATE (PF) 100 MCG/2ML SOLUTION: Performed by: NURSE ANESTHETIST, CERTIFIED REGISTERED

## 2023-04-26 PROCEDURE — 25010000002 ONDANSETRON PER 1 MG: Performed by: NURSE ANESTHETIST, CERTIFIED REGISTERED

## 2023-04-26 PROCEDURE — 82962 GLUCOSE BLOOD TEST: CPT

## 2023-04-26 PROCEDURE — 25010000002 DEXAMETHASONE PER 1 MG: Performed by: NURSE ANESTHETIST, CERTIFIED REGISTERED

## 2023-04-26 PROCEDURE — 0 CEFAZOLIN SODIUM-DEXTROSE 2-3 GM-%(50ML) RECONSTITUTED SOLUTION: Performed by: SURGERY

## 2023-04-26 PROCEDURE — 25010000002 PROPOFOL 200 MG/20ML EMULSION: Performed by: NURSE ANESTHETIST, CERTIFIED REGISTERED

## 2023-04-26 RX ORDER — MAGNESIUM HYDROXIDE 1200 MG/15ML
LIQUID ORAL AS NEEDED
Status: DISCONTINUED | OUTPATIENT
Start: 2023-04-26 | End: 2023-04-26 | Stop reason: HOSPADM

## 2023-04-26 RX ORDER — CEFAZOLIN SODIUM 2 G/50ML
2 SOLUTION INTRAVENOUS
Status: COMPLETED | OUTPATIENT
Start: 2023-04-26 | End: 2023-04-26

## 2023-04-26 RX ORDER — FENTANYL CITRATE 50 UG/ML
INJECTION, SOLUTION INTRAMUSCULAR; INTRAVENOUS AS NEEDED
Status: DISCONTINUED | OUTPATIENT
Start: 2023-04-26 | End: 2023-04-26 | Stop reason: SURG

## 2023-04-26 RX ORDER — ONDANSETRON 2 MG/ML
4 INJECTION INTRAMUSCULAR; INTRAVENOUS ONCE AS NEEDED
Status: DISCONTINUED | OUTPATIENT
Start: 2023-04-26 | End: 2023-04-26 | Stop reason: HOSPADM

## 2023-04-26 RX ORDER — HYDROCODONE BITARTRATE AND ACETAMINOPHEN 7.5; 325 MG/1; MG/1
1 TABLET ORAL EVERY 6 HOURS PRN
Qty: 15 TABLET | Refills: 0 | Status: SHIPPED | OUTPATIENT
Start: 2023-04-26

## 2023-04-26 RX ORDER — DEXAMETHASONE SODIUM PHOSPHATE 4 MG/ML
INJECTION, SOLUTION INTRA-ARTICULAR; INTRALESIONAL; INTRAMUSCULAR; INTRAVENOUS; SOFT TISSUE AS NEEDED
Status: DISCONTINUED | OUTPATIENT
Start: 2023-04-26 | End: 2023-04-26 | Stop reason: SURG

## 2023-04-26 RX ORDER — SILICONES/ADHESIVE TAPE
COMBINATION PACKAGE (EA) TOPICAL AS NEEDED
Status: DISCONTINUED | OUTPATIENT
Start: 2023-04-26 | End: 2023-04-26 | Stop reason: HOSPADM

## 2023-04-26 RX ORDER — HYDROCODONE BITARTRATE AND ACETAMINOPHEN 7.5; 325 MG/1; MG/1
1 TABLET ORAL EVERY 6 HOURS PRN
Qty: 15 TABLET | Refills: 0 | Status: SHIPPED | OUTPATIENT
Start: 2023-04-26 | End: 2023-04-26 | Stop reason: SDUPTHER

## 2023-04-26 RX ORDER — SODIUM CHLORIDE, SODIUM LACTATE, POTASSIUM CHLORIDE, CALCIUM CHLORIDE 600; 310; 30; 20 MG/100ML; MG/100ML; MG/100ML; MG/100ML
1000 INJECTION, SOLUTION INTRAVENOUS CONTINUOUS
Status: DISCONTINUED | OUTPATIENT
Start: 2023-04-26 | End: 2023-04-26 | Stop reason: HOSPADM

## 2023-04-26 RX ORDER — LIDOCAINE HYDROCHLORIDE 20 MG/ML
INJECTION, SOLUTION INTRAVENOUS AS NEEDED
Status: DISCONTINUED | OUTPATIENT
Start: 2023-04-26 | End: 2023-04-26 | Stop reason: SURG

## 2023-04-26 RX ORDER — KETAMINE HCL IN NACL, ISO-OSM 100MG/10ML
SYRINGE (ML) INJECTION AS NEEDED
Status: DISCONTINUED | OUTPATIENT
Start: 2023-04-26 | End: 2023-04-26 | Stop reason: SURG

## 2023-04-26 RX ORDER — PROPOFOL 10 MG/ML
INJECTION, EMULSION INTRAVENOUS AS NEEDED
Status: DISCONTINUED | OUTPATIENT
Start: 2023-04-26 | End: 2023-04-26 | Stop reason: SURG

## 2023-04-26 RX ORDER — ONDANSETRON 2 MG/ML
INJECTION INTRAMUSCULAR; INTRAVENOUS AS NEEDED
Status: DISCONTINUED | OUTPATIENT
Start: 2023-04-26 | End: 2023-04-26 | Stop reason: SURG

## 2023-04-26 RX ADMIN — CEFAZOLIN SODIUM 2 G: 2 SOLUTION INTRAVENOUS at 10:33

## 2023-04-26 RX ADMIN — FENTANYL CITRATE 50 MCG: 50 INJECTION INTRAMUSCULAR; INTRAVENOUS at 10:41

## 2023-04-26 RX ADMIN — FENTANYL CITRATE 50 MCG: 50 INJECTION INTRAMUSCULAR; INTRAVENOUS at 10:49

## 2023-04-26 RX ADMIN — DEXAMETHASONE SODIUM PHOSPHATE 4 MG: 4 INJECTION, SOLUTION INTRAMUSCULAR; INTRAVENOUS at 10:44

## 2023-04-26 RX ADMIN — SODIUM CHLORIDE, POTASSIUM CHLORIDE, SODIUM LACTATE AND CALCIUM CHLORIDE 1000 ML: 600; 310; 30; 20 INJECTION, SOLUTION INTRAVENOUS at 09:41

## 2023-04-26 RX ADMIN — Medication 10 MG: at 10:49

## 2023-04-26 RX ADMIN — LIDOCAINE HYDROCHLORIDE 60 MG: 20 INJECTION, SOLUTION INTRAVENOUS at 10:41

## 2023-04-26 RX ADMIN — Medication 10 MG: at 10:41

## 2023-04-26 RX ADMIN — ONDANSETRON 4 MG: 2 INJECTION INTRAMUSCULAR; INTRAVENOUS at 10:44

## 2023-04-26 RX ADMIN — PROPOFOL 125 MG: 10 INJECTION, EMULSION INTRAVENOUS at 10:41

## 2023-04-26 NOTE — ANESTHESIA POSTPROCEDURE EVALUATION
Patient: Obey Collins Sr.    Procedure Summary     Date: 04/26/23 Room / Location: [facility] OR  /  [facility] OR    Anesthesia Start: 1033 Anesthesia Stop: 1134    Procedure: 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 (Right) Diagnosis:       Hematoma      (Hematoma [T14.8XXA])    Surgeons: Kenneth Carrasco MD Provider: Ari Cagle CRNA    Anesthesia Type: general ASA Status: 3          Anesthesia Type: general    Vitals  No vitals data found for the desired time range.          Post Anesthesia Care and Evaluation    Patient location during evaluation: PACU  Patient participation: complete - patient participated  Level of consciousness: awake and alert  Pain score: 1  Pain management: satisfactory to patient    Airway patency: patent  Anesthetic complications: No anesthetic complications  PONV Status: none  Cardiovascular status: acceptable and stable  Respiratory status: acceptable, nonlabored ventilation, spontaneous ventilation and unassisted  Hydration status: acceptable    Comments: Vitals signs as noted in nursing documentation as per protocol.

## 2023-04-26 NOTE — OP NOTE
PATIENT:    Obey Collins .    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 They are here now today for excision of this hematoma, preoperative ultrasound did show possible flow in the hematoma.    ANESTHESIA:  General Anesthesia     OPERATIVE PROCEDURE:  The patient was taken to the operating room, placed in the supine position, and given general endotracheal anesthesia.  They were prepped and draped in the normal sterile fashion.  They did receive preoperative IV antibiotics.  The nursing staff did perform intraoperative timeout prior to the incision.  I did tanya the patient myself preoperatively.    An incision was made over this area on the right side of the forehead with a 15 blade knife, this was dissected down and hematoma was evacuated.  There was an arterial structure in the subcutaneous tissue that was bleeding and I do think the patient did have a pseudoaneurysm in this location.  The sac itself was excised with bovie electrocautery and 15 blade knife, but electrocautery was used for hemostasis.  I then closed the incision with interrupted 4-0 nylon vertical mattress sutures and local lidocaine was used for postoperative anesthetic.    Dressings were applied patient was stable at this point in time.    The patient was stable at this point in time and subsequently transferred back to the recovery room in stable condition.     Kenneth  VIANNEY Carrasco MD  4/26/2023  11:07 EDT

## 2023-04-26 NOTE — ANESTHESIA PROCEDURE NOTES
Airway  Urgency: elective    Date/Time: 4/26/2023 10:42 AM  End Time:4/26/2023 10:43 AM    General Information and Staff    Patient location during procedure: OR  CRNA/CAA: Ari Cagle CRNA    Indications and Patient Condition    Preoxygenated: yes  Mask difficulty assessment: 0 - not attempted    Final Airway Details  Final airway type: supraglottic airway      Successful airway: classic  Size 4     Number of attempts at approach: 1  Assessment: lips, teeth, and gum same as pre-op and atraumatic intubation    Additional Comments  LMA seats well        
done

## 2023-04-26 NOTE — ANESTHESIA PREPROCEDURE EVALUATION
Anesthesia Evaluation     Patient summary reviewed and Nursing notes reviewed   no history of anesthetic complications:  NPO Solid Status: > 8 hours  NPO Liquid Status: > 8 hours           Airway   Mallampati: II  TM distance: >3 FB  Neck ROM: full  Possible difficult intubation  Dental - normal exam     Pulmonary - negative pulmonary ROS and normal exam   Cardiovascular   Exercise tolerance: good (4-7 METS)    ECG reviewed  PT is on anticoagulation therapy  Patient on routine beta blocker  Rhythm: regular  Rate: normal    (+) hypertension, valvular problems/murmurs (MVR), CAD, dysrhythmias Atrial Fib, murmur, hyperlipidemia,     ROS comment: . Preop cardiovascular exam  -- Presents today for preoperative cardiac risk assessment before undergoing TURP due to BPH  --No current chest pain or exertional anginal symptoms  --ECG today shows atrial fibrillation, no acute or prior ischemic changes  --No evidence of decompensated CHF, valvulopathy, or arrhythmia  --The patient is currently at low risk for adverse perioperative cardiac events with a low risk of revised cardiac risk index, given his functional status is greater than 4 METS without angina, it is reasonable to proceed with the proposed surgery without further cardiac testing or interventions     2. Permanent atrial fibrillation   -- Rate controlled, asymptomatic  --Continue atenolol for rate control strategy  --As the patient wishes to switch from Coumadin to a DOAC, recommend discontinuation of Coumadin prior to undergoing TURP, no need for Lovenox bridge  --When okay to restart anticoagulation from a urologic standpoint, will start Eliquis 5 mg p.o. twice daily     3.  History of mitral regurgitation  --S/p remote mitral valve repair with placement of an annuloplasty ring     4. Benign hypertension  -- BP adequately controlled today     5. Pure hypercholesterolemia  -- No current lipid profile available for review  --Not currently on statin therapy  --Will  review next lipid profile obtained by PCP     6. Type 2 diabetes mellitus  -- No hemoglobin A1c available for review  --Management per PCP    Neuro/Psych- negative ROS  GI/Hepatic/Renal/Endo    (+)   renal disease CRI, diabetes mellitus type 2,     Musculoskeletal (-) negative ROS    Abdominal  - normal exam    Abdomen: soft.  Bowel sounds: normal.   Substance History - negative use     OB/GYN negative ob/gyn ROS         Other - negative ROS       ROS/Med Hx Other: RLS  BPH                    Anesthesia Plan    ASA 3     general     (Risks and benefits discussed including risk of aspiration, recall and dental damage. All patient questions answered. Will continue with POC.)  intravenous induction     Anesthetic plan, risks, benefits, and alternatives have been provided, discussed and informed consent has been obtained with: patient.  Pre-procedure education provided  Plan discussed with CRNA.        CODE STATUS:

## 2023-05-08 ENCOUNTER — TELEPHONE (OUTPATIENT)
Dept: SURGERY | Facility: CLINIC | Age: 78
End: 2023-05-08

## 2023-05-08 ENCOUNTER — OFFICE VISIT (OUTPATIENT)
Dept: SURGERY | Facility: CLINIC | Age: 78
End: 2023-05-08
Payer: MEDICARE

## 2023-05-08 VITALS
BODY MASS INDEX: 24.71 KG/M2 | DIASTOLIC BLOOD PRESSURE: 80 MMHG | HEIGHT: 70 IN | TEMPERATURE: 97.7 F | WEIGHT: 172.62 LBS | SYSTOLIC BLOOD PRESSURE: 144 MMHG | OXYGEN SATURATION: 97 % | HEART RATE: 86 BPM

## 2023-05-08 DIAGNOSIS — Z48.89 POSTOPERATIVE VISIT: Primary | ICD-10-CM

## 2023-05-08 PROCEDURE — 3077F SYST BP >= 140 MM HG: CPT | Performed by: SURGERY

## 2023-05-08 PROCEDURE — 99024 POSTOP FOLLOW-UP VISIT: CPT | Performed by: SURGERY

## 2023-05-08 PROCEDURE — 1159F MED LIST DOCD IN RCRD: CPT | Performed by: SURGERY

## 2023-05-08 PROCEDURE — 3079F DIAST BP 80-89 MM HG: CPT | Performed by: SURGERY

## 2023-05-08 PROCEDURE — 1160F RVW MEDS BY RX/DR IN RCRD: CPT | Performed by: SURGERY

## 2023-06-14 ENCOUNTER — TELEPHONE (OUTPATIENT)
Dept: UROLOGY | Facility: CLINIC | Age: 78
End: 2023-06-14

## 2023-06-14 NOTE — TELEPHONE ENCOUNTER
UNABLE TO WARM LÓPEZ    Caller: Obey Collins Sr.    Relationship to patient: Self    Best call back number: [phone]    Type of visit: IN OFFICE PROCEDURE-CYSTO    Additional notes: PT CALLED TO CANCEL CYSTO SCHEDULED 6/19.  PT DOES NOT WANT TO RESCHEDULE, NOT HAVING ISSUES ANY LONGER.

## 2023-06-16 ENCOUNTER — TELEPHONE (OUTPATIENT)
Dept: UROLOGY | Facility: CLINIC | Age: 78
End: 2023-06-16

## 2023-06-16 NOTE — TELEPHONE ENCOUNTER
Caller: Obey Collins Sr.     Relationship to patient: SELF    Patient is needing: PT CALLED AND STATED THAT HE WOULD LIKE TO CANCEL THE CYSTO SCHEDULED FOR 08/10/23 AS HE DOESN'T WANT TO HAVE ANOTHER ONE DONE. PT WOULD LIKE TO KEEP THE APPT WITH JUNE RIVERA ON 08/14/23.

## 2023-06-19 ENCOUNTER — OFFICE VISIT (OUTPATIENT)
Dept: CARDIOLOGY | Facility: CLINIC | Age: 78
End: 2023-06-19
Payer: MEDICARE

## 2023-06-19 VITALS
BODY MASS INDEX: 25.05 KG/M2 | HEIGHT: 70 IN | WEIGHT: 175 LBS | OXYGEN SATURATION: 93 % | SYSTOLIC BLOOD PRESSURE: 110 MMHG | DIASTOLIC BLOOD PRESSURE: 60 MMHG | HEART RATE: 76 BPM

## 2023-06-19 DIAGNOSIS — E78.00 PURE HYPERCHOLESTEROLEMIA: ICD-10-CM

## 2023-06-19 DIAGNOSIS — E11.00 TYPE 2 DIABETES MELLITUS WITH HYPEROSMOLARITY WITHOUT COMA, WITHOUT LONG-TERM CURRENT USE OF INSULIN: ICD-10-CM

## 2023-06-19 DIAGNOSIS — I10 BENIGN HYPERTENSION: ICD-10-CM

## 2023-06-19 DIAGNOSIS — I05.9 MITRAL VALVE DISORDER: Primary | ICD-10-CM

## 2023-06-19 DIAGNOSIS — I73.9 PAD (PERIPHERAL ARTERY DISEASE): ICD-10-CM

## 2023-06-19 DIAGNOSIS — I34.0 NONRHEUMATIC MITRAL VALVE REGURGITATION: ICD-10-CM

## 2023-06-19 DIAGNOSIS — I48.21 PERMANENT ATRIAL FIBRILLATION: ICD-10-CM

## 2023-06-19 RX ORDER — ZOLPIDEM TARTRATE 5 MG/1
5 TABLET ORAL
COMMUNITY
Start: 2023-05-25

## 2023-06-19 NOTE — PROGRESS NOTES
"             [facility] Cardiology Office Follow Up Note    Obey Collins Sr.  [id number]  2023    Primary Care Provider: Reentta Wade PA    Chief Complaint: Routine follow-up    History of Present Illness:   Mr. Obey Collins is a 77 y.o. male who presents to the Cardiology Clinic for routine follow-up.  The patient has a past medical history significant for hyperlipidemia, type 2 diabetes mellitus, and BPH.  He has a past cardiac history significant for remote mitral valve repair with placement of an annuloplasty ring.  He also has a history of permanent atrial fibrillation on a rate control strategy.  He returns today for routine follow-up.  In terms of his atrial fibrillation, he has been doing well without palpitations.  He continues to tolerate Coumadin without significant bleeding or bruising.  He remains active at home, without chest pain or exertional angina.  He does report a nearly constant \"bluish\" discoloration of his bilateral toes.  He reports constant bilateral lower extremity discomfort particularly in his feet.  No other specific complaints today.    Past Cardiac Testin. Last Coronary Angio: Remote, records unavailable  2. Prior Stress Testing: None  3. Last Echo: Records unavailable  4. Prior Holter Monitor: Unknown    Review of Systems:   Review of Systems   Constitutional:  Negative for activity change, appetite change, chills, diaphoresis, fatigue, fever, unexpected weight gain and unexpected weight loss.   Eyes:  Negative for blurred vision and double vision.   Respiratory:  Negative for cough, chest tightness, shortness of breath and wheezing.    Cardiovascular:  Negative for chest pain, palpitations and leg swelling.   Gastrointestinal:  Negative for abdominal pain, anal bleeding, blood in stool and GERD.   Endocrine: Negative for cold intolerance and heat intolerance.   Genitourinary:  Negative for hematuria.   Musculoskeletal:         Bilateral lower " extremity pain   Neurological:  Negative for dizziness, syncope, weakness and light-headedness.   Hematological:  Does not bruise/bleed easily.   Psychiatric/Behavioral:  Negative for depressed mood and stress. The patient is not nervous/anxious.      I have reviewed and/or updated the patient's past medical, past surgical, family, social history, problem list and allergies as appropriate.     Medications:     Current Outpatient Medications:     aspirin 81 MG chewable tablet, Chew 1 tablet Daily., Disp: , Rfl:     atenolol (TENORMIN) 25 MG tablet, Take 1 tablet by mouth Daily., Disp: , Rfl:     diphenhydrAMINE (BENADRYL) 25 mg capsule, 1 capsule At Night As Needed., Disp: , Rfl:     DULoxetine (CYMBALTA) 60 MG capsule, Take 1 capsule by mouth Daily., Disp: , Rfl:     Loratadine 10 MG capsule, Take 1 capsule by mouth Daily., Disp: , Rfl:     LORazepam (ATIVAN) 1 MG tablet, Take 1 tablet by mouth., Disp: , Rfl:     metFORMIN (GLUCOPHAGE) 500 MG tablet, Take 1 tablet by mouth 2 (Two) Times a Day., Disp: , Rfl:     tadalafil (Cialis) 20 MG tablet, Take 1 tablet by mouth Daily As Needed for Erectile Dysfunction., Disp: 30 tablet, Rfl: 3    vitamin B-12 (CYANOCOBALAMIN) 100 MCG tablet, Take 5 tablets by mouth 4 (Four) Times a Week., Disp: , Rfl:     warfarin (COUMADIN) 4 MG tablet, TAKE 2 TABLETS BY MOUTH ON FRIDAY AND TAKE 1 AND 1/2 TABLET ON ALL OTHER DAYS, Disp: , Rfl:     zolpidem (AMBIEN) 5 MG tablet, Take 1 tablet by mouth every night at bedtime., Disp: , Rfl:     cefdinir (OMNICEF) 300 MG capsule, Take 1 capsule by mouth 2 (Two) Times a Day. (Patient not taking: Reported on 6/19/2023), Disp: 20 capsule, Rfl: 0    fluticasone (FLONASE) 50 MCG/ACT nasal spray, , Disp: , Rfl:     HYDROcodone-acetaminophen (NORCO) 7.5-325 MG per tablet, Take 1 tablet by mouth Every 6 (Six) Hours As Needed for Moderate Pain (Patient not taking: Reported on 6/19/2023), Disp: 15 tablet, Rfl: 0    metFORMIN (GLUMETZA) 500 MG (MOD) 24 hr  "tablet, Take 1 tablet by mouth 2 (Two) Times a Day With Meals. (Patient not taking: Reported on 6/19/2023), Disp: , Rfl:     omeprazole (priLOSEC) 20 MG capsule, , Disp: , Rfl:     phenazopyridine (PYRIDIUM) 100 MG tablet, Take 1 tablet by mouth 3 (Three) Times a Day As Needed (urinary burning). (Patient not taking: Reported on 4/20/2023), Disp: 21 tablet, Rfl: 0    rOPINIRole (REQUIP) 0.25 MG tablet, Take 1 tablet by mouth every night at bedtime. (Patient not taking: Reported on 4/20/2023), Disp: , Rfl:     sulfamethoxazole-trimethoprim (Bactrim DS) 800-160 MG per tablet, Take 1 tablet by mouth 2 (Two) Times a Day. (Patient not taking: Reported on 4/20/2023), Disp: 6 tablet, Rfl: 0    zolpidem (AMBIEN) 10 MG tablet, Take 5 mg by mouth At Night As Needed. (Patient not taking: Reported on 6/19/2023), Disp: , Rfl:     Physical Exam:  Vital Signs:   Vitals:    06/19/23 1326   BP: 110/60   BP Location: Right arm   Patient Position: Sitting   Cuff Size: Adult   Pulse: 76   SpO2: 93%   Weight: 79.4 kg (175 lb)   Height: 177.8 cm (70\")       Physical Exam  Constitutional:       General: He is not in acute distress.     Appearance: Normal appearance. He is not diaphoretic.   HENT:      Head: Normocephalic and atraumatic.   Cardiovascular:      Rate and Rhythm: Normal rate and regular rhythm.      Heart sounds: No murmur heard.  Pulmonary:      Effort: Pulmonary effort is normal. No respiratory distress.      Breath sounds: Normal breath sounds. No stridor. No wheezing, rhonchi or rales.   Abdominal:      General: Bowel sounds are normal. There is no distension.      Palpations: Abdomen is soft.      Tenderness: There is no abdominal tenderness. There is no guarding or rebound.   Musculoskeletal:         General: No swelling. Normal range of motion.      Cervical back: Neck supple. No tenderness.      Comments: Cyanotic appearance of bilateral toes   Skin:     General: Skin is warm and dry.   Neurological:      General: No " focal deficit present.      Mental Status: He is alert and oriented to person, place, and time.   Psychiatric:         Mood and Affect: Mood normal.         Behavior: Behavior normal.       Results Review:   I reviewed the patient's new clinical results.        Assessment / Plan:     1.  Permanent atrial fibrillation   -- Remains rate controlled and asymptomatic  --Continue atenolol for rate control strategy  --Chronically anticoagulated with Coumadin for CVA prophylaxis    2.  Suspected PAD  -- Will obtain bilateral lower extremity arterial duplex to further evaluate     3.  History of mitral regurgitation  --S/p remote mitral valve repair with placement of an annuloplasty ring  --Will repeat echocardiogram for surveillance     4. Benign hypertension  -- BP remains adequately controlled today     5. Pure hypercholesterolemia  -- No recent lipid profile available for review  --Managed by PCP     6. Type 2 diabetes mellitus  -- No hemoglobin A1c available for review  --Management per PCP    Follow Up:   Return in about 1 year (around 6/19/2024).      Thank you for allowing me to participate in the care of your patient. Please to not hesitate to contact me with additional questions or concerns.     EDGAR Capps MD  Interventional Cardiology   06/19/2023  13:35 EDT

## 2023-08-14 ENCOUNTER — OFFICE VISIT (OUTPATIENT)
Dept: UROLOGY | Facility: CLINIC | Age: 78
End: 2023-08-14
Payer: MEDICARE

## 2023-08-14 VITALS
OXYGEN SATURATION: 99 % | HEIGHT: 70 IN | TEMPERATURE: 98.5 F | HEART RATE: 89 BPM | WEIGHT: 175 LBS | BODY MASS INDEX: 25.05 KG/M2 | RESPIRATION RATE: 16 BRPM

## 2023-08-14 DIAGNOSIS — R39.15 BENIGN PROSTATIC HYPERPLASIA (BPH) WITH URINARY URGENCY: Primary | ICD-10-CM

## 2023-08-14 DIAGNOSIS — N52.9 ERECTILE DYSFUNCTION, UNSPECIFIED ERECTILE DYSFUNCTION TYPE: ICD-10-CM

## 2023-08-14 DIAGNOSIS — N40.1 BENIGN PROSTATIC HYPERPLASIA (BPH) WITH URINARY URGENCY: Primary | ICD-10-CM

## 2023-08-14 PROCEDURE — 99214 OFFICE O/P EST MOD 30 MIN: CPT | Performed by: NURSE PRACTITIONER

## 2023-08-14 PROCEDURE — 1159F MED LIST DOCD IN RCRD: CPT | Performed by: NURSE PRACTITIONER

## 2023-08-14 PROCEDURE — 1160F RVW MEDS BY RX/DR IN RCRD: CPT | Performed by: NURSE PRACTITIONER

## 2023-08-14 RX ORDER — TADALAFIL 20 MG/1
20 TABLET ORAL DAILY PRN
Qty: 30 TABLET | Refills: 5 | Status: SHIPPED | OUTPATIENT
Start: 2023-08-14

## 2023-08-14 NOTE — PROGRESS NOTES
Office Visit Established Male Patient     Patient Name: Obey Collins Sr.  : 1945   MRN: [id number]     Chief Complaint:   Chief Complaint   Patient presents with    Benign Prostatic Hypertrophy     Pt states he feels much better and is not currently having any urinary symptoms, stating he feels like a teenager.       History of Present Illness: Mr. Obey Collins Sr. is a 78 y.o. male who presents today for follow up with 6 mos s/p TURP with Benign pathology.  He reports today he feels like a teenager again has 0 urinary symptoms or concerns.  Denies any recent gross hematuria.  He would like refills on his Cialis.      Subjective      Review of System:   As noted in HPI    Past Medical History:   Past Medical History:   Diagnosis Date    Atrial fibrillation     Cataract, bilateral     s/p surgery    Coronary artery disease     Diabetes mellitus     [place] (hard of hearing)     bilateral hearing aids    Hx of exercise stress test     Kidney stones     Neuropathy     RLS (restless legs syndrome)     Snores     Wears glasses     for reading       Past Surgical History:   Past Surgical History:   Procedure Laterality Date    APPENDECTOMY      CARDIAC CATHETERIZATION      COLONOSCOPY      CYSTOSCOPY TRANSURETHRAL RESECTION OF PROSTATE N/A 2022    Procedure: TRANSURETHRAL RESECTION OF PROSTATE;  Surgeon: Maik Graham MD;  Location: [facility];  Service: Urology;  Laterality: N/A;    ENDOSCOPY      HEAD/NECK LESION/CYST EXCISION Right 2023    Procedure: 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    Social History:   Social History     Socioeconomic History    Marital status:    Tobacco Use    Smoking status: Former     Types:  Pipe     Passive exposure: Past    Smokeless tobacco: Never   Vaping Use    Vaping Use: Never used   Substance and Sexual Activity    Alcohol use: Never    Drug use: Never    Sexual activity: Defer       Medications:     Current Outpatient Medications:     aspirin 81 MG chewable tablet, Chew 1 tablet Daily., Disp: , Rfl:     atenolol (TENORMIN) 25 MG tablet, Take 1 tablet by mouth Daily., Disp: , Rfl:     diphenhydrAMINE (BENADRYL) 25 mg capsule, 1 capsule At Night As Needed., Disp: , Rfl:     DULoxetine (CYMBALTA) 60 MG capsule, Take 1 capsule by mouth Daily., Disp: , Rfl:     Loratadine 10 MG capsule, Take 1 capsule by mouth Daily., Disp: , Rfl:     LORazepam (ATIVAN) 1 MG tablet, Take 1 tablet by mouth., Disp: , Rfl:     metFORMIN (GLUCOPHAGE) 500 MG tablet, Take 1 tablet by mouth 2 (Two) Times a Day., Disp: , Rfl:     metFORMIN (GLUMETZA) 500 MG (MOD) 24 hr tablet, Take 1 tablet by mouth 2 (Two) Times a Day With Meals., Disp: , Rfl:     tadalafil (Cialis) 20 MG tablet, Take 1 tablet by mouth Daily As Needed for Erectile Dysfunction., Disp: 30 tablet, Rfl: 5    vitamin B-12 (CYANOCOBALAMIN) 100 MCG tablet, Take 5 tablets by mouth 4 (Four) Times a Week., Disp: , Rfl:     zolpidem (AMBIEN) 5 MG tablet, Take 1 tablet by mouth every night at bedtime., Disp: , Rfl:     cefdinir (OMNICEF) 300 MG capsule, Take 1 capsule by mouth 2 (Two) Times a Day. (Patient not taking: Reported on 6/19/2023), Disp: 20 capsule, Rfl: 0    fluticasone (FLONASE) 50 MCG/ACT nasal spray, , Disp: , Rfl:     HYDROcodone-acetaminophen (NORCO) 7.5-325 MG per tablet, Take 1 tablet by mouth Every 6 (Six) Hours As Needed for Moderate Pain (Patient not taking: Reported on 6/19/2023), Disp: 15 tablet, Rfl: 0    omeprazole (priLOSEC) 20 MG capsule, , Disp: , Rfl:     phenazopyridine (PYRIDIUM) 100 MG tablet, Take 1 tablet by mouth 3 (Three) Times a Day As Needed (urinary burning). (Patient not taking: Reported on 4/20/2023), Disp: 21 tablet, Rfl: 0     "rOPINIRole (REQUIP) 0.25 MG tablet, Take 1 tablet by mouth every night at bedtime. (Patient not taking: Reported on 4/20/2023), Disp: , Rfl:     sulfamethoxazole-trimethoprim (Bactrim DS) 800-160 MG per tablet, Take 1 tablet by mouth 2 (Two) Times a Day. (Patient not taking: Reported on 4/20/2023), Disp: 6 tablet, Rfl: 0    warfarin (COUMADIN) 4 MG tablet, TAKE 2 TABLETS BY MOUTH ON FRIDAY AND TAKE 1 AND 1/2 TABLET ON ALL OTHER DAYS, Disp: , Rfl:     zolpidem (AMBIEN) 10 MG tablet, Take 5 mg by mouth At Night As Needed. (Patient not taking: Reported on 6/19/2023), Disp: , Rfl:     Allergies:   No Known Allergies    Objective     Physical Exam:   Vital Signs:   Vitals:    08/14/23 1019   Pulse: 89   Resp: 16   Temp: 98.5 øF (36.9 øC)   SpO2: 99%   Weight: 79.4 kg (175 lb)   Height: 177.8 cm (70\")     Body mass index is 25.11 kg/mý.     Physical Exam  Constitutional: NAD, WDWN.   Neurological: A + O x 3.   Psychiatric:  Normal mood and affect    Labs  Brief Urine Lab Results  (Last result in the past 365 days)        Color   Clarity   Blood   Leuk Est   Nitrite   Protein   CREAT   Urine HCG        04/12/23 1109 Yellow   Clear   Trace   Negative   Negative   Negative                   Lab Results   Component Value Date    GLUCOSE 126 (H) 04/08/2023    CALCIUM 9.2 04/08/2023     04/08/2023    K 4.1 04/08/2023    CO2 29.0 04/08/2023     04/08/2023    BUN 14 04/08/2023    CREATININE 0.92 04/08/2023    BCR 15.2 04/08/2023    ANIONGAP 9.0 04/08/2023       Lab Results   Component Value Date    WBC 5.25 04/08/2023    HGB 13.5 04/08/2023    HCT 40.4 04/08/2023    MCV 93.3 04/08/2023     04/08/2023       Urine Culture          4/8/2023    11:40   Urine Culture   Urine Culture <25,000 CFU/mL Mixed Doretha Isolated         Radiographic Studies  PVR  Post-void residual performed with ultrasound scanner by staff and interpreted by sherri colindres    I have reviewed the above labs and imaging.     Assessment / Plan  "     Assessment/Plan:   Diagnoses and all orders for this visit:    1. Benign prostatic hyperplasia (BPH) with urinary urgency (Primary)    2. Erectile dysfunction, unspecified erectile dysfunction type  -     tadalafil (Cialis) 20 MG tablet; Take 1 tablet by mouth Daily As Needed for Erectile Dysfunction.  Dispense: 30 tablet; Refill: 5    78-year-old male here status post TURP 6 months ago doing well reports 0 urinary symptoms today and PVR is benign.  We reviewed the patient has had no gross hematuria after that initial fall he had in April cystoscopy was canceled.  He will continue to monitor and if any gross hematuria episodes return patient will follow-up in the office.  Patient uses Cialis for ED and is satisfied with these results request refill today.    IPSS and PVR 1 year  Cialis 20 mg 1 hour prior to intercourse as needed    Follow Up:   Return in about 1 year (around 8/14/2024) for Follow up Pennie.    BRIGITTE Waterman,NP-C  [facility]

## 2023-09-15 ENCOUNTER — TELEPHONE (OUTPATIENT)
Dept: CARDIOLOGY | Facility: CLINIC | Age: 78
End: 2023-09-15
Payer: MEDICARE

## 2023-09-15 NOTE — TELEPHONE ENCOUNTER
Caller: Obey Collins Sr.    Relationship: Self    Best call back number: [phone]     Requested Prescriptions:   Requested Prescriptions      No prescriptions requested or ordered in this encounter      PATIENT IS REQUESTING ELIQUIS. HE DOES NOT WANT TO CONTINUE TAKING WARFARIN.    Pharmacy where request should be sent: [facility] [place] [id number] [place] [address] - [phone]  - [phone]     Last office visit with prescribing clinician: 6/19/2023   Last telemedicine visit with prescribing clinician: Visit date not found   Next office visit with prescribing clinician: 6/21/2024     Additional details provided by patient: PATIENT WOULD LIKE ELIQUIS. HE DOES NOT WANT TO CONTINUE TAKING WARFARIN. HE HAS ONE WEEK OF WARFARIN LEFT.    Does the patient have less than a 3 day supply:  [x] Yes  [] No    Would you like a call back once the refill request has been completed: [x] Yes [] No    If the office needs to give you a call back, can they leave a voicemail: [x] Yes [] No    Oksana Hoffmann Rep   09/15/23 11:23 EDT

## 2023-09-22 NOTE — TELEPHONE ENCOUNTER
Spoke with patient. Information was given and understood. I advised for patient to contact the office if the Eliquis is too expensive.

## 2024-01-26 ENCOUNTER — TELEPHONE (OUTPATIENT)
Dept: UROLOGY | Facility: CLINIC | Age: 79
End: 2024-01-26
Payer: MEDICARE

## 2024-01-26 NOTE — TELEPHONE ENCOUNTER
Caller: Obey Collins Sr.     Relationship: [unfilled] SELF    Best call back number: [phone]    What is your medical concern? PT IS CURRENTLY TAKING THE GENERIC FOR VIAGRA.  HIS INSURANCE WILL NO LONGER COVER THIS. PLEASE PRESCRIBE SOMETHING THAT WILL BE COVERED. PT USES [facility].THANK YOU.

## 2024-06-21 ENCOUNTER — OFFICE VISIT (OUTPATIENT)
Dept: CARDIOLOGY | Facility: CLINIC | Age: 79
End: 2024-06-21
Payer: MEDICARE

## 2024-06-21 VITALS
WEIGHT: 178 LBS | HEART RATE: 83 BPM | RESPIRATION RATE: 17 BRPM | HEIGHT: 70 IN | BODY MASS INDEX: 25.48 KG/M2 | DIASTOLIC BLOOD PRESSURE: 82 MMHG | OXYGEN SATURATION: 98 % | SYSTOLIC BLOOD PRESSURE: 144 MMHG

## 2024-06-21 DIAGNOSIS — I10 BENIGN HYPERTENSION: ICD-10-CM

## 2024-06-21 DIAGNOSIS — E11.00 TYPE 2 DIABETES MELLITUS WITH HYPEROSMOLARITY WITHOUT COMA, UNSPECIFIED WHETHER LONG TERM INSULIN USE: ICD-10-CM

## 2024-06-21 DIAGNOSIS — E78.5 HYPERLIPIDEMIA, UNSPECIFIED HYPERLIPIDEMIA TYPE: ICD-10-CM

## 2024-06-21 DIAGNOSIS — I48.21 PERMANENT ATRIAL FIBRILLATION: Primary | ICD-10-CM

## 2024-06-21 DIAGNOSIS — I05.9 MITRAL VALVE DISORDER: ICD-10-CM

## 2024-06-21 RX ORDER — WARFARIN SODIUM 4 MG/1
4 TABLET ORAL
COMMUNITY
Start: 2024-04-22

## 2024-06-21 NOTE — ASSESSMENT & PLAN NOTE
-No recent A1c visible  -Managed by PCP    Suspected PAD   -Cyanotic toes noted on last year's exam  -PAD ruled out with dopplers to lower extremities.  No significant peripheral vascular disease, mild vascular calcifications noted.

## 2024-06-21 NOTE — ASSESSMENT & PLAN NOTE
-Adequately controlled today, states runs lower at home  -Continue current blood pressure medications

## 2024-06-21 NOTE — PROGRESS NOTES
[facility] Cardiology    Office Consult     Obey Collins Sr.  [id number]  2024    Referred By: No ref. provider found    Chief Complaint   Patient presents with    Mitral valve disorder       Subjective     History of Present Illness:   Obey Collins Sr. is a 78 y.o. male who presents to the Cardiology Clinic for a routine follow up for remote mitral valve repair and atrial fibrillation. The patient has a past medical history significant for hyperlipidemia, type 2 diabetes mellitus, and BPH.  Past cardiac history significant for remote mitral valve repair with placement of an annuloplasty ring and a history of permanent atrial fibrillation on a rate control strategy. He reports he has been feeling well from a cardiac standpoint.  Denies any chest pain, shortness of breath, or palpitations.  He has transitioned from Eliquis to Coumadin due to cost issues and is tolerating well with his PCP monitoring his INR levels.  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  He does not have any issues with any current medications.    Past Cardiac Testin. Last Coronary Angio: Remote, records unavailable  2. Prior Stress Testing: None  3. Last Echo: 2023--LVEF-50-55%, grade 2 diastolic dysfunction, s/p mitral valve repair with mild mitral and atortic regurgitation noted  4. Prior Holter Monitor: Unknown    Review of Systems   Constitutional:  Negative for activity change and fatigue.   Respiratory:  Negative for chest tightness and shortness of breath.    Cardiovascular:  Negative for chest pain, palpitations and leg swelling.   Neurological:  Negative for dizziness.   All other systems reviewed and are negative.       Past Medical History:   Diagnosis Date    Atrial fibrillation     Cataract, bilateral     s/p surgery    Coronary artery disease     Diabetes mellitus     [place] (hard of hearing)     bilateral hearing aids    Hx of exercise  stress test     Kidney stones     Neuropathy     RLS (restless legs syndrome)     Snores     Wears glasses     for reading       Past Surgical History:   Procedure Laterality Date    APPENDECTOMY      CARDIAC CATHETERIZATION  1998    COLONOSCOPY      CYSTOSCOPY TRANSURETHRAL RESECTION OF PROSTATE N/A 12/28/2022    Procedure: TRANSURETHRAL RESECTION OF PROSTATE;  Surgeon: Maik Graham MD;  Location: [facility];  Service: Urology;  Laterality: N/A;    ENDOSCOPY      HEAD/NECK LESION/CYST EXCISION Right 4/26/2023    Procedure: 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 No pertinent family history.    Social History     Socioeconomic History    Marital status:    Tobacco Use    Smoking status: Former     Types: Pipe     Passive exposure: Past    Smokeless tobacco: Never   Vaping Use    Vaping status: Never Used   Substance and Sexual Activity    Alcohol use: Never    Drug use: Never    Sexual activity: Defer         Current Outpatient Medications:     aspirin 81 MG chewable tablet, Chew 1 tablet Daily., Disp: , Rfl:     atenolol (TENORMIN) 25 MG tablet, Take 1 tablet by mouth Daily., Disp: , Rfl:     DULoxetine (CYMBALTA) 60 MG capsule, Take 1 capsule by mouth Daily., Disp: , Rfl:     metFORMIN (GLUCOPHAGE) 500 MG tablet, Take 1 tablet by mouth 2 (Two) Times a Day., Disp: , Rfl:     vitamin B-12 (CYANOCOBALAMIN) 100 MCG tablet, Take 5 tablets by mouth 4 (Four) Times a Week., Disp: , Rfl:     warfarin (COUMADIN) 4 MG tablet, Take 1 tablet by mouth., Disp: , Rfl:     zolpidem (AMBIEN) 5 MG tablet, Take 1 tablet by mouth every night at bedtime., Disp: , Rfl:     phenazopyridine (PYRIDIUM) 100 MG tablet, Take 1 tablet by mouth 3 (Three) Times a Day As Needed (urinary burning). (Patient not  "taking: Reported on 4/20/2023), Disp: 21 tablet, Rfl: 0    tadalafil (Cialis) 20 MG tablet, Take 1 tablet by mouth Daily As Needed for Erectile Dysfunction. (Patient not taking: Reported on 6/21/2024), Disp: 30 tablet, Rfl: 5    No Known Allergies    Objective     Vitals:    06/21/24 1125   BP: 144/82   BP Location: Left arm   Patient Position: Sitting   Cuff Size: Adult   Pulse: 83   Resp: 17   SpO2: 98%   Weight: 80.7 kg (178 lb)   Height: 177.8 cm (70\")     Body mass index is 25.54 kg/m².    Physical Exam  Vitals and nursing note reviewed.   Constitutional:       General: He is not in acute distress.     Appearance: Normal appearance. He is not toxic-appearing.   HENT:      Head: Normocephalic.      Mouth/Throat:      Mouth: Mucous membranes are moist.   Eyes:      Pupils: Pupils are equal, round, and reactive to light.   Cardiovascular:      Rate and Rhythm: Normal rate. Rhythm irregular.      Pulses: Normal pulses.      Heart sounds: Normal heart sounds. No murmur heard.  Pulmonary:      Effort: Pulmonary effort is normal.      Breath sounds: Normal breath sounds. No wheezing, rhonchi or rales.   Musculoskeletal:      Right lower leg: No edema.      Left lower leg: No edema.   Skin:     General: Skin is warm and dry.      Capillary Refill: Capillary refill takes less than 2 seconds.   Neurological:      Mental Status: He is alert and oriented to person, place, and time. Mental status is at baseline.   Psychiatric:         Mood and Affect: Mood normal.         Behavior: Behavior normal.         Thought Content: Thought content normal.         Results Review:   I reviewed the patient's new clinical results.  I reviewed the patient's other test results and agree with the interpretation    Procedures    Assessment & Plan  Permanent atrial fibrillation  -Remains rate controlled and asymptomatic  -Continue Atenolol for rate control strategy  -Chronically anticoagulated with Coumadin for CVA prophylaxis  -Follow up in " one year, sooner if needed  Mitral valve disorder  -S/p remote mitral valve repair with placement of an annuloplasty ring  -Surveillance echo 6/2023--LVEF-50-55%, grade 2 diastolic dysfunction, s/p mitral valve repair with mild mitral and atortic regurgitation noted  Hyperlipidemia, unspecified hyperlipidemia type   -Managed by PCP  -Recommend yearly lipid panels  Benign hypertension  -Adequately controlled today, states runs lower at home  -Continue current blood pressure medications    Type 2 diabetes mellitus with hyperosmolarity without coma, unspecified whether long term insulin use  -No recent A1c visible  -Managed by PCP    Suspected PAD   -Cyanotic toes noted on last year's exam  -PAD ruled out with dopplers to lower extremities.  No significant peripheral vascular disease, mild vascular calcifications noted.           Preventative Cardiology:   Tobacco Cessation: N/A   Advance Care Planning: ACP discussion was held with the patient during this visit. Patient does not have an advance directive, declines further assistance.     Follow Up:   Return in about 1 year (around 6/21/2025).      Thank you for allowing me to participate in the care of your patient. Please to not hesitate to contact me with additional questions or concerns.     Beverly Weathers, APRN

## 2024-06-21 NOTE — ASSESSMENT & PLAN NOTE
-S/p remote mitral valve repair with placement of an annuloplasty ring  -Surveillance echo 6/2023--LVEF-50-55%, grade 2 diastolic dysfunction, s/p mitral valve repair with mild mitral and atortic regurgitation noted

## 2024-06-21 NOTE — ASSESSMENT & PLAN NOTE
-Remains rate controlled and asymptomatic  -Continue Atenolol for rate control strategy  -Chronically anticoagulated with Coumadin for CVA prophylaxis  -Follow up in one year, sooner if needed

## 2024-12-12 ENCOUNTER — TELEPHONE (OUTPATIENT)
Dept: CARDIOLOGY | Facility: CLINIC | Age: 79
End: 2024-12-12

## 2024-12-12 NOTE — TELEPHONE ENCOUNTER
Caller: Obey Collins Sr.    Relationship: Self    Best call back number: [phone] (home)     What is the best time to reach you: ANYTIME    Who are you requesting to speak with (clinical staff, provider,  specific staff member): CLINICAL    What was the call regarding: PT SAID HE RECEIVED A VISIT FROM AN RN THAT TOLD HIM HE SHOULD CONSULT HIS CARDIOLOGIST FOR FOOT NEUROPATHY ISSUES. PLEASE CALL PT WHEN AVAILABLE.

## 2024-12-23 ENCOUNTER — TELEPHONE (OUTPATIENT)
Dept: CARDIOLOGY | Facility: CLINIC | Age: 79
End: 2024-12-23

## 2024-12-23 DIAGNOSIS — I99.9 CIRCULATION PROBLEM: Primary | ICD-10-CM

## 2024-12-23 NOTE — TELEPHONE ENCOUNTER
Caller: Obey Collins Sr.    Relationship: Self    Best call back number: [phone]    What is the medical concern/diagnosis: FEET TURNING [facility] TOLD PATIENT TO CHECK ON SEEING VASCULAR DR    What specialty or service is being requested: VASCULAR DR    What is the provider, practice or medical service name: PATIENT WOULD LIKE DR TOWNSEND TO REFER HIM TO A VASCULAR DR.    What is the office location:     What is the office phone number:     Any additional details: PATIENT DOES NOT KNOW OF A VASCULAR DR. PATIENT WANTS TO KNOW IF DR. TOWNSEND KNOWS ONE HE CAN BE REFERRED TO. PLEASE REACH OUT.

## 2024-12-30 DIAGNOSIS — I73.9 PERIPHERAL VASCULAR DISEASE, UNSPECIFIED: Primary | ICD-10-CM

## 2025-01-29 NOTE — TELEPHONE ENCOUNTER
let him know he will need to get it using [facility]. We do not do PA's for this medication. [facility]   [facility]

## 2025-02-05 ENCOUNTER — TELEPHONE (OUTPATIENT)
Dept: CARDIOLOGY | Facility: CLINIC | Age: 80
End: 2025-02-05
Payer: MEDICARE

## 2025-02-05 NOTE — TELEPHONE ENCOUNTER
Caller: Obey Collins Sr.    Relationship: Self    Best call back number: [phone] (home)     What is the best time to reach you: ANYTIME    Who are you requesting to speak with (clinical staff, provider,  specific staff member): CLINICAL    What was the call regarding: PT CANCELED US EDDI LOWER EXT ARTERIES COMP ORDER SCHEDULED 2.3.25. PT SAID SOMEONE WAS SUPPOSED TO REACH OUT TO HIM TO DISCUSS HIS CARE DUE TO BLOOD CLOTS HE WAS TOLD HER HAD AT [facility]. PLEASE CALL PT WHEN AVAILABLE.

## 2025-03-20 ENCOUNTER — OFFICE VISIT (OUTPATIENT)
Dept: CARDIAC SURGERY | Facility: CLINIC | Age: 80
End: 2025-03-20
Payer: MEDICARE

## 2025-03-20 VITALS
DIASTOLIC BLOOD PRESSURE: 80 MMHG | BODY MASS INDEX: 26.05 KG/M2 | TEMPERATURE: 96.8 F | HEART RATE: 74 BPM | SYSTOLIC BLOOD PRESSURE: 132 MMHG | WEIGHT: 182 LBS | HEIGHT: 70 IN | OXYGEN SATURATION: 98 %

## 2025-03-20 DIAGNOSIS — I73.9 PVD (PERIPHERAL VASCULAR DISEASE): Primary | ICD-10-CM

## 2025-03-20 RX ORDER — PANTOPRAZOLE SODIUM 40 MG/1
40 TABLET, DELAYED RELEASE ORAL DAILY
COMMUNITY

## 2025-03-20 RX ORDER — SILDENAFIL CITRATE 20 MG/1
20 TABLET ORAL 2 TIMES DAILY
COMMUNITY

## 2025-03-20 NOTE — PROGRESS NOTES
"     [facility] Cardiothoracic Surgery New Patient Office Note     Date of Encounter: 2025     Name: Obey Collins Sr.  : 1945     Referred By: Jamal Capps MD  PCP: Renetta Wade PA    Chief Complaint:    Chief Complaint   Patient presents with    Consult     NP per Dr. Chandler Capps for PVD-Complains of right leg pain       Subjective      History of Present Illness:    Obey Collins Sr. is a 79 y.o. male referred to Dr. Godinez per Cardiology, Dr. Chandler Capps for PVD.  PMH:  Type 2 DM, hyperlipidemia, BPH, Hx mitral valve repair w/ annuloplasty ring , CAD, permanent afib on rate control, and PVD.  2024, Home Health Nursing reported patient c/o cold and purple feet.  Arterial duplex was ordered by Cardiology (not completed).  Patient brings in home visit based JAYSON dated 12/10/24: Right 0.28 and left 0.80.  A CTA abdominal aorta with runoff was performed @ [facility] 25.  Patient reports both feet \"feel like a toothache\" right more than left.  These symptoms are worse when walking and improve but do not resolve at rest.      Review of Systems:  Review of Systems   Constitutional: Negative for chills, decreased appetite, diaphoresis, fever, malaise/fatigue, night sweats, weight gain and weight loss.   HENT:  Negative for hoarse voice.    Eyes:  Negative for blurred vision, double vision and visual disturbance.   Cardiovascular:  Positive for claudication, dyspnea on exertion and leg swelling. Negative for chest pain, irregular heartbeat, near-syncope, orthopnea, palpitations, paroxysmal nocturnal dyspnea and syncope.   Respiratory:  Positive for shortness of breath. Negative for cough, hemoptysis, sputum production and wheezing.    Hematologic/Lymphatic: Negative for adenopathy and bleeding problem. Bruises/bleeds easily.   Skin:  Positive for color change (right lower extremity). Negative for nail changes, poor wound healing and rash.   Musculoskeletal:  Negative for " back pain, falls and muscle cramps.   Gastrointestinal:  Negative for abdominal pain, dysphagia and heartburn.   Genitourinary:  Negative for flank pain.   Neurological:  Positive for dizziness. Negative for brief paralysis, disturbances in coordination, focal weakness, headaches, light-headedness, loss of balance, numbness, paresthesias, sensory change, vertigo and weakness.   Psychiatric/Behavioral:  Negative for depression and suicidal ideas. The patient is nervous/anxious.    Allergic/Immunologic: Negative for persistent infections.       I have reviewed the following portions of the patient's history: problem list, current medications, allergies, past surgical history, past medical history, past social history, past family history, and ROS and confirm it's accurate.    Allergies:  Allergies   Allergen Reactions    Statins Myalgia       Medications:      Current Outpatient Medications:     aspirin 81 MG chewable tablet, Chew 1 tablet Daily., Disp: , Rfl:     atenolol (TENORMIN) 25 MG tablet, Take 1 tablet by mouth Daily., Disp: , Rfl:     metFORMIN (GLUCOPHAGE) 500 MG tablet, Take 1 tablet by mouth 2 (Two) Times a Day., Disp: , Rfl:     pantoprazole (PROTONIX) 40 MG EC tablet, Take 1 tablet by mouth Daily., Disp: , Rfl:     sildenafil (REVATIO) 20 MG tablet, Take 1 tablet by mouth 2 (Two) Times a Day., Disp: , Rfl:     warfarin (COUMADIN) 4 MG tablet, Take 1 tablet by mouth., Disp: , Rfl:     zolpidem (AMBIEN) 5 MG tablet, Take 1 tablet by mouth every night at bedtime., Disp: , Rfl:     DULoxetine (CYMBALTA) 60 MG capsule, Take 1 capsule by mouth Daily. (Patient not taking: Reported on 3/20/2025), Disp: , Rfl:     phenazopyridine (PYRIDIUM) 100 MG tablet, Take 1 tablet by mouth 3 (Three) Times a Day As Needed (urinary burning). (Patient not taking: Reported on 4/20/2023), Disp: 21 tablet, Rfl: 0    vitamin B-12 (CYANOCOBALAMIN) 100 MCG tablet, Take 5 tablets by mouth 4 (Four) Times a Week. (Patient not taking:  Reported on 3/20/2025), Disp: , Rfl:     History:   Past Medical History:   Diagnosis Date    Anxiety     Atrial fibrillation     Cataract, bilateral     s/p surgery    Coronary artery disease     Diabetes mellitus     GERD (gastroesophageal reflux disease)     [place] (hard of hearing)     bilateral hearing aids    Hx of exercise stress test     Kidney stones     Neuropathy     Peripheral vascular disease     RLS (restless legs syndrome)     Snores     Wears glasses     for reading       Past Surgical History:   Procedure Laterality Date    APPENDECTOMY      CARDIAC CATHETERIZATION      COLONOSCOPY      CYSTOSCOPY TRANSURETHRAL RESECTION OF PROSTATE N/A 2022    Procedure: TRANSURETHRAL RESECTION OF PROSTATE;  Surgeon: Maik Graham MD;  Location: [facility];  Service: Urology;  Laterality: N/A;    ENDOSCOPY      HEAD/NECK LESION/CYST EXCISION Right 2023    Procedure: 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;  Surgeon: Kenneth Carrasco MD;  Location: [facility] OR;  Service: General;  Laterality: Right;    HERNIA REPAIR      x 2    MITRAL VALVE REPAIR/REPLACEMENT      OTHER SURGICAL HISTORY      annuloplasty ring       Social History     Socioeconomic History    Marital status:     Number of children: 5   Tobacco Use    Smoking status: Former     Types: Pipe     Quit date:      Years since quittin.2     Passive exposure: Past    Smokeless tobacco: Never   Vaping Use    Vaping status: Never Used   Substance and Sexual Activity    Alcohol use: Never    Drug use: Never    Sexual activity: Defer        Family History   Problem Relation Age of Onset    Peripheral vascular disease Mother     Diabetes Mother     Diabetes Father        Objective   Physical Exam:  Vitals:    25 1445 25 1446   BP: 142/70 132/80   BP Location: Left arm Right arm   Patient Position: Sitting Sitting   Pulse: 74    Temp: 96.8 °F (36 °C)    SpO2: 98%    Weight: 82.6 kg (182 lb)    Height: 177.8 cm  "(70\")  Comment: patient reported       Body mass index is 26.11 kg/m².    Physical Exam  Vitals reviewed.   Constitutional:       General: He is not in acute distress.     Appearance: He is well-developed and well-groomed. He is not toxic-appearing.   HENT:      Head: Normocephalic and atraumatic.   Eyes:      General: Lids are normal.      Conjunctiva/sclera: Conjunctivae normal.      Pupils: Pupils are equal, round, and reactive to light.   Cardiovascular:      Rate and Rhythm: Normal rate and regular rhythm.      Pulses:           Popliteal pulses are 1+ on the right side and 1+ on the left side.        Dorsalis pedis pulses are detected w/ Doppler on the right side and detected w/ Doppler on the left side.        Posterior tibial pulses are detected w/ Doppler on the right side and detected w/ Doppler on the left side.      Heart sounds: S1 normal and S2 normal. Murmur heard.      Diastolic murmur is present with a grade of 2/4.      Comments: Loud biphasic pedal pulse doppler signals bilaterally.  Popliteal pulses verified w/ doppler:  loud biphasic signals  Pulmonary:      Effort: Pulmonary effort is normal. No respiratory distress.      Breath sounds: Normal breath sounds.   Musculoskeletal:         General: Normal range of motion.      Cervical back: Normal range of motion and neck supple.      Right lower leg: No edema.      Left lower leg: No edema.   Feet:      Right foot:      Skin integrity: Skin integrity normal.      Toenail Condition: Right toenails are normal.      Left foot:      Skin integrity: Skin integrity normal.      Toenail Condition: Left toenails are normal.      Comments: Right > left rubor discoloration distal feet.  Both feet are warm without ulceration.  Normal lower leg hair distribution bilaterally  Skin:     General: Skin is warm and dry.      Capillary Refill: Capillary refill takes less than 2 seconds.   Neurological:      General: No focal deficit present.      Mental Status: He " is alert and oriented to person, place, and time.   Psychiatric:         Attention and Perception: Attention normal.         Mood and Affect: Mood normal.         Speech: Speech normal.         Behavior: Behavior is cooperative.         Imaging/Labs:  CTA abdominal aorta w/ runoff @ [facility] 1/21/25 (Personally reviewed and agree w/ single vessel runoff to both feet)  Impression:  1. Moderate multifocal stenosis in the right SFA.  Definitive single-vessel runoff to the right foot from posterior tibial artery.  Scattered mild to moderate multifocal stenosis of the left SFA.  Faint contrast opacification to the left foot from posterior tibial artery.  Bilateral anterior tibial arteries and peroneal arteries are not well-opacified beyond the level of the distal calf.  2.  2 mm calcification to the right of the bladder may represent bladder calculus or recently passed ureteral stone.  Correlate clinically.  3.  Prominent prostate.  Correlate with PSA values      ARTERIAL DUPLEX DOPPLER EVALUATION OF THE LOWER EXTREMITIES WITH  BILATERALLY SPECTRAL ANALYSIS 6/19/2023   FINDINGS:    RIGHT LOWER EXTREMITY.      Velocities cm/sec :    CFA: 95.3  SFA Proximal: 97.4   SFA Mid: 105   SFA Dist: 98.5  POP: 91.1  PTA Distal: 69.9  [place] Distal: 43.4  Waveforms are are triphasic in the right iliac artery, common femoral artery, proximal, mid, and distal superficial femoral artery, popliteal artery, and posterior tibial artery. Mild vascular calcifications.      LEFT LOWER EXTREMITY.  Velocities cm/sec :    CFA: 112  SFA Proximal: 99.6   SFA Mid: 96.4   SFA Dist: 123  POP: 110  PTA Distal: 90.0  [place] Distal: 108.0  Waveforms are triphasic in the left iliac artery, common femoral artery, proximal, mid and distal superficial femoral artery, popliteal artery, and anterior tibial artery. Waveforms are biphasic in the posterior tibial artery. Mild vascular calcifications.     IMPRESSION:  No significant peripheral vascular disease. Mild  "vascular  calcifications. Bilateral lower extremity arteries are patent.   Report was signed 6/19/2023 by CHANDRIKA Corley.    Assessment / Plan      Assessment / Plan:  1. PVD (peripheral vascular disease)  - 79 y.o. male referred to Dr. Godinez per Cardiology, Dr. Chandler Capps for PVD.    - PMH:  Type 2 DM, hyperlipidemia, BPH, Hx mitral valve repair w/ annuloplasty ring, CAD, permanent afib on rate control, and PVD.    - December 2024, Home Health Nursing reported patient c/o cold and purple feet.  [facility] JAYSON dated 12/10/24: Right 0.28 and left 0.80.    - Arterial duplex was ordered by Cardiology (not completed).    - CTA abdominal aorta with runoff was performed @ [facility] 1/21/25 (Ordered by [employer]): Personally reviewed CTA imaging which demonstrated unremarkable descending thoracic and abdominal aorta, patent right common femoral artery and deep femoral artery, mild to moderate multifocal stenosis in the right SFA, patent right popliteal and anterior tibial/posterior tibial artery however, contrast opacification not well observed in the distal right lower leg but single-vessel runoff to the right foot is observed.  On the left, common femoral, superficial and deep femoral arteries are patent and well-opacified.  Mild to moderate atherosclerosis left SFA with patent left popliteal, anterior tibial, posterior tibial and peroneal artery patency proximally but again faint contrast opacification into the left foot.  This could represent distal disease in bilateral lower legs or contrast bolus timing error.   - Arterial duplex 6/19/2023 demonstrated triphasic arterial waveforms throughout both lower extremities/no significant PVD  - Current medical therapy includes aspirin and warfarin  - Allergy list \"statins  - Not a candidate for cilostazol due to CHF history  - Discussed/recommend structured walking regimen  - Discussed limitations of small vessel percutaneous intervention in the distal lower " extremities except in cases of acute ischemia/limb loss  - Will plan to see patient back in clinic in 3 months with updated lower extremity arterial duplex as ordered by cardiology along with updated JAYSON    Patient Education: A structured walking regimen is used to improve the circulation or blood flow in your legs. Recognize that walking may hurt at first - and that is good. In fact, the goal is to walk at a pace that causes mild or moderate pain or tightness in your legs. Pace yourself, stop to rest for a few minutes, but then resume walking. This discomfort triggers your body to improve your circulation. Repeat several times: Walk at a pace that causes mild or moderate leg pain, then rest, and keep going. Over time, you may find you are able to walk longer with less pain. That is a sign that your blood vessels are recovering. It may take months, so be patient and persistent.        Follow Up:   Return in about 3 months (around 6/20/2025) for BLE arterial duplex/JAYSON.   Or sooner for any further concerns or worsening sign and symptoms. If unable to reach us in the office please dial 911 or go to the nearest emergency department.      BRIGITTE Ascencio  [facility] Cardiothoracic Surgery    Time Spent: I spent 60 minutes caring for Obey on this date of service. This time includes time spent by me in the following activities: preparing for the visit, reviewing tests, obtaining and/or reviewing a separately obtained history, performing a medically appropriate examination and/or evaluation, counseling and educating the patient/family/caregiver, documenting information in the medical record, independently interpreting results and communicating that information with the patient/family/caregiver, and care coordination.

## 2025-05-06 DIAGNOSIS — I73.9 PVD (PERIPHERAL VASCULAR DISEASE): Primary | ICD-10-CM

## 2025-05-28 ENCOUNTER — TELEPHONE (OUTPATIENT)
Dept: CARDIAC SURGERY | Facility: CLINIC | Age: 80
End: 2025-05-28
Payer: MEDICARE

## 2025-05-28 NOTE — TELEPHONE ENCOUNTER
Received fax from [facility] on 5/27/25. Patient didn't want to schedule testing. I called patient on 5/28/25 and patient still refused. I have scanned in the fax from [facility] and cancelled his follow up.

## 2025-06-20 ENCOUNTER — OFFICE VISIT (OUTPATIENT)
Dept: CARDIOLOGY | Facility: CLINIC | Age: 80
End: 2025-06-20
Payer: MEDICARE

## 2025-06-20 VITALS
HEIGHT: 70 IN | BODY MASS INDEX: 25.62 KG/M2 | DIASTOLIC BLOOD PRESSURE: 80 MMHG | HEART RATE: 65 BPM | RESPIRATION RATE: 20 BRPM | WEIGHT: 179 LBS | OXYGEN SATURATION: 98 % | SYSTOLIC BLOOD PRESSURE: 122 MMHG

## 2025-06-20 DIAGNOSIS — I05.9 MITRAL VALVE DISORDER: ICD-10-CM

## 2025-06-20 DIAGNOSIS — E11.00 TYPE 2 DIABETES MELLITUS WITH HYPEROSMOLARITY WITHOUT COMA, UNSPECIFIED WHETHER LONG TERM INSULIN USE: ICD-10-CM

## 2025-06-20 DIAGNOSIS — E78.5 HYPERLIPIDEMIA, UNSPECIFIED HYPERLIPIDEMIA TYPE: ICD-10-CM

## 2025-06-20 DIAGNOSIS — I10 BENIGN HYPERTENSION: ICD-10-CM

## 2025-06-20 DIAGNOSIS — I48.21 PERMANENT ATRIAL FIBRILLATION: Primary | ICD-10-CM

## 2025-06-20 PROCEDURE — 99213 OFFICE O/P EST LOW 20 MIN: CPT | Performed by: INTERNAL MEDICINE

## 2025-06-20 PROCEDURE — 3079F DIAST BP 80-89 MM HG: CPT | Performed by: INTERNAL MEDICINE

## 2025-06-20 PROCEDURE — 3074F SYST BP LT 130 MM HG: CPT | Performed by: INTERNAL MEDICINE

## 2025-06-20 PROCEDURE — 93000 ELECTROCARDIOGRAM COMPLETE: CPT | Performed by: INTERNAL MEDICINE

## 2025-06-20 NOTE — PROGRESS NOTES
[facility] Cardiology Office Follow Up Note    Obey Collins Sr.  [id number]  2025    Primary Care Provider: Renetta Wade PA    Chief Complaint: Routine follow-up    History of Present Illness:   Mr. Obey Collins is a 79y.o. male who presents to the Cardiology Clinic for routine follow-up.  The patient has a past medical history significant for hyperlipidemia, type 2 diabetes mellitus, and BPH.  He has a past cardiac history significant for remote mitral valve repair with placement of an annuloplasty ring.  He also has a history of permanent atrial fibrillation on a rate control strategy.  Since last follow-up, the patient has continued to do well from a cardiac standpoint.  He remains active at home without chest pain or exertional chest discomfort.  He denies significant exertional dyspnea.  His atrial fibrillation remains asymptomatic.  No episodes of palpitations.  He continues to tolerate Coumadin for CVA prophylaxis.  No new complaints today.     Past Cardiac Testin. Last Coronary Angio: Remote, records unavailable  2. Prior Stress Testing: None  3. Last Echo:   1.  Normal left ventricular size and systolic function, LVEF 55-60%.  2.  Mild concentric LVH.  3.  Grade 2 diastolic dysfunction.  4.  Normal right ventricular size and systolic function.  5.  Severely increased left atrial volume index.  6.  Status post mitral valve repair, mean transvalvular gradient 6 mmHg.  7.  Mild mitral regurgitation.  8.  Mild aortic regurgitation.  9.  Mild tricuspid regurgitation, RVSP 40 mmHg.  4. Prior Holter Monitor: Unknown    Review of Systems:   Review of Systems   Constitutional:  Negative for activity change, appetite change, chills, diaphoresis, fatigue, fever, unexpected weight gain and unexpected weight loss.   Eyes:  Negative for blurred vision and double vision.   Respiratory:  Negative for cough, chest tightness, shortness of breath and wheezing.     Cardiovascular:  Negative for chest pain, palpitations and leg swelling.   Gastrointestinal:  Negative for abdominal pain, anal bleeding, blood in stool and GERD.   Endocrine: Negative for cold intolerance and heat intolerance.   Genitourinary:  Negative for hematuria.   Neurological:  Negative for dizziness, syncope, weakness and light-headedness.   Hematological:  Does not bruise/bleed easily.   Psychiatric/Behavioral:  Negative for depressed mood and stress. The patient is not nervous/anxious.        I have reviewed and/or updated the patient's past medical, past surgical, family, social history, problem list and allergies as appropriate.     Medications:     Current Outpatient Medications:     aspirin 81 MG chewable tablet, Chew 1 tablet Daily., Disp: , Rfl:     atenolol (TENORMIN) 25 MG tablet, Take 1 tablet by mouth Daily., Disp: , Rfl:     DULoxetine (CYMBALTA) 60 MG capsule, Take 1 capsule by mouth Daily., Disp: , Rfl:     metFORMIN (GLUCOPHAGE) 500 MG tablet, Take 1 tablet by mouth 2 (Two) Times a Day., Disp: , Rfl:     pantoprazole (PROTONIX) 40 MG EC tablet, Take 1 tablet by mouth Daily., Disp: , Rfl:     sildenafil (REVATIO) 20 MG tablet, Take 1 tablet by mouth 2 (Two) Times a Day., Disp: , Rfl:     warfarin (COUMADIN) 4 MG tablet, Take 1 tablet by mouth., Disp: , Rfl:     zolpidem (AMBIEN) 5 MG tablet, Take 1 tablet by mouth every night at bedtime., Disp: , Rfl:     phenazopyridine (PYRIDIUM) 100 MG tablet, Take 1 tablet by mouth 3 (Three) Times a Day As Needed (urinary burning). (Patient not taking: Reported on 4/20/2023), Disp: 21 tablet, Rfl: 0    vitamin B-12 (CYANOCOBALAMIN) 100 MCG tablet, Take 5 tablets by mouth 4 (Four) Times a Week. (Patient not taking: Reported on 6/20/2025), Disp: , Rfl:     Physical Exam:  Vital Signs:   Vitals:    06/20/25 1041   BP: 122/80   BP Location: Right arm   Patient Position: Sitting   Cuff Size: Adult   Pulse: 65   Resp: 20   SpO2: 98%   Weight: 81.2 kg (179 lb)  "  Height: 177.8 cm (70\")       Physical Exam  Constitutional:       General: He is not in acute distress.     Appearance: Normal appearance. He is not diaphoretic.   HENT:      Head: Normocephalic and atraumatic.   Cardiovascular:      Rate and Rhythm: Normal rate. Rhythm irregular.      Heart sounds: No murmur heard.  Pulmonary:      Effort: Pulmonary effort is normal. No respiratory distress.      Breath sounds: Normal breath sounds. No stridor. No wheezing, rhonchi or rales.   Abdominal:      General: Bowel sounds are normal. There is no distension.      Palpations: Abdomen is soft.      Tenderness: There is no abdominal tenderness. There is no guarding or rebound.   Musculoskeletal:         General: No swelling. Normal range of motion.      Cervical back: Neck supple. No tenderness.   Skin:     General: Skin is warm and dry.   Neurological:      General: No focal deficit present.      Mental Status: He is alert and oriented to person, place, and time.   Psychiatric:         Mood and Affect: Mood normal.         Behavior: Behavior normal.         Results Review:   I reviewed the patient's new clinical results.      ECG 12 Lead    Date/Time: 6/20/2025 11:22 AM  Performed by: Jamal Capps MD    Authorized by: Jamal Capps MD  Comparison: not compared with previous ECG   Rhythm: atrial fibrillation  Rate: normal  QRS axis: normal    Clinical impression: abnormal EKG          Assessment / Plan:     1.  Permanent atrial fibrillation   -- Rate controlled on ECG today  --Remains asymptomatic  --Continue atenolol for rate control  --Chronically anticoagulated with Coumadin for CVA prophylaxis  --Follow-up in 1 year, sooner if required     2.  History of mitral regurgitation  --S/p remote mitral valve repair with placement of an annuloplasty ring  -- Mild MR on last echocardiogram for surveillance     3.  Benign hypertension  -- BP well-controlled     4.  Pure hypercholesterolemia  -- Lipid profile as per " PCP     5.  Type 2 diabetes mellitus  -- Management by PCP    Follow Up:   Return in about 1 year (around 6/20/2026).      Thank you for allowing me to participate in the care of your patient. Please to not hesitate to contact me with additional questions or concerns.     EDGAR Capps MD  Interventional Cardiology   06/20/2025  10:41 EDT

## (undated) DEVICE — GLV SURG SENSICARE W/ALOE PF LF 8.5 STRL

## (undated) DEVICE — SUT GUT CHRM 4/0 SH 27IN G121H

## (undated) DEVICE — IRRIGATOR TOOMEY 70CC

## (undated) DEVICE — ST FLD IRR WARM

## (undated) DEVICE — SUT VIC 3/0 TIES 18IN J110T

## (undated) DEVICE — RICH CYSTO: Brand: MEDLINE INDUSTRIES, INC.

## (undated) DEVICE — SUT ETHLN 4/0 PSL 4/0 30IN 562H

## (undated) DEVICE — INTENDED FOR TISSUE SEPARATION, AND OTHER PROCEDURES THAT REQUIRE A SHARP SURGICAL BLADE TO PUNCTURE OR CUT.: Brand: BARD-PARKER ® CARBON RIB-BACK BLADES

## (undated) DEVICE — NDL HYPO ECLPS SFTY 25G 1 1/2IN

## (undated) DEVICE — FLEXIBLE YANKAUER,MEDIUM TIP, NO VACUUM CONTROL: Brand: ARGYLE

## (undated) DEVICE — SPNG LAP 18X18IN LF STRL PK/5

## (undated) DEVICE — SLV SCD CALF HEMOFORCE DVT THERP REPROC MD

## (undated) DEVICE — SAFESECURE,SECUREMENT,FOLEY CATH,STERILE: Brand: MEDLINE

## (undated) DEVICE — CVR HNDL LIGHT RIGID

## (undated) DEVICE — COUNT NDL FOAM STRIP W/MAG 60CT

## (undated) DEVICE — GLV SURG SENSICARE LT W/ALOE PF LF 7 STRL

## (undated) DEVICE — SPNG GZ WOVN 4X4IN 12PLY 10/BX STRL

## (undated) DEVICE — TUBING, SUCTION, 1/4" X 12', STRAIGHT: Brand: MEDLINE

## (undated) DEVICE — TBG PENCL TELESCP MEGADYNE SMOKE EVAC 10FT

## (undated) DEVICE — DRSNG WND BORDR/ADHS NONADHR/GZ LF 4X4IN STRL

## (undated) DEVICE — CATH FOL COUDE CARSON 2WY 22F 5CC

## (undated) DEVICE — SHEET,DRAPE,70X100,STERILE: Brand: MEDLINE

## (undated) DEVICE — GOWN,PREVENTION PLUS,XLARGE,STERILE: Brand: MEDLINE

## (undated) DEVICE — TOWEL,OR,DSP,ST,BLUE,STD,4/PK,20PK/CS: Brand: MEDLINE

## (undated) DEVICE — GAUZE,SPONGE,4"X4",16PLY,XRAY,STRL,LF: Brand: MEDLINE

## (undated) DEVICE — STRIP,CLOSURE,WOUND,MEDI-STRIP,1/2X4: Brand: MEDLINE

## (undated) DEVICE — SOL IRR NACL 0.9PCT 3000ML

## (undated) DEVICE — 24FR BIPLR COAG ELECTRDE, STERILE: Brand: N.A.

## (undated) DEVICE — SYR LL TP 10ML STRL

## (undated) DEVICE — CATHETER,FOLEY,COUDE,LATEX,18FR,10ML: Brand: MEDLINE

## (undated) DEVICE — IRRIGATOR BULB ASEPTO 60CC STRL

## (undated) DEVICE — DRAINBAG,ANTI-REFLUX TOWER,L/F,2000ML,LL: Brand: MEDLINE

## (undated) DEVICE — BAG,LEG,COMFORT-STRAPS,LARGE,32OZ: Brand: MEDLINE

## (undated) DEVICE — TP SXN YANKR BULB STRL

## (undated) DEVICE — UNDYED BRAIDED (POLYGLACTIN 910), SYNTHETIC ABSORBABLE SUTURE: Brand: COATED VICRYL

## (undated) DEVICE — CUTTING LOOP, BIPOLAR, 24/26 FR.: Brand: N.A.

## (undated) DEVICE — SKIN PREP TRAY 4 COMPARTM TRAY: Brand: MEDLINE INDUSTRIES, INC.

## (undated) DEVICE — NDL HYPO ECLPS SFTY 22G 1 1/2IN